# Patient Record
Sex: FEMALE | Race: ASIAN | NOT HISPANIC OR LATINO | Employment: UNEMPLOYED | URBAN - METROPOLITAN AREA
[De-identification: names, ages, dates, MRNs, and addresses within clinical notes are randomized per-mention and may not be internally consistent; named-entity substitution may affect disease eponyms.]

---

## 2017-09-06 ENCOUNTER — OFFICE VISIT (OUTPATIENT)
Dept: URGENT CARE | Facility: PHYSICIAN GROUP | Age: 27
End: 2017-09-06
Payer: COMMERCIAL

## 2017-09-06 VITALS
DIASTOLIC BLOOD PRESSURE: 76 MMHG | RESPIRATION RATE: 16 BRPM | BODY MASS INDEX: 19.83 KG/M2 | OXYGEN SATURATION: 96 % | HEART RATE: 85 BPM | WEIGHT: 101 LBS | HEIGHT: 60 IN | TEMPERATURE: 99.6 F | SYSTOLIC BLOOD PRESSURE: 110 MMHG
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
## 2017-09-06 PROCEDURE — 99203 OFFICE O/P NEW LOW 30 MIN: CPT | Performed by: PHYSICIAN ASSISTANT

## 2017-09-06 RX ORDER — TRIAMCINOLONE ACETONIDE 1 MG/G
1 CREAM TOPICAL 2 TIMES DAILY
Qty: 1 TUBE | Refills: 0 | Status: SHIPPED | OUTPATIENT
Start: 2017-09-06 | End: 2017-09-19

## 2017-09-06 RX ORDER — SULFAMETHOXAZOLE AND TRIMETHOPRIM 800; 160 MG/1; MG/1
1 TABLET ORAL 2 TIMES DAILY
Qty: 14 TAB | Refills: 0 | Status: SHIPPED | OUTPATIENT
Start: 2017-09-06 | End: 2017-09-19

## 2017-09-06 ASSESSMENT — ENCOUNTER SYMPTOMS
CHILLS: 0
VOMITING: 0
RESPIRATORY NEGATIVE: 1
LEG SWELLING: 1
SENSORY CHANGE: 1
TINGLING: 0
ABDOMINAL PAIN: 0
NAUSEA: 0
DIARRHEA: 0
FEVER: 0

## 2017-09-06 NOTE — PROGRESS NOTES
Subjective:      Kenyetta LAW Do is a 27 y.o. female who presents with Leg Swelling (C/o swelling, pain LT leg onset today.  Paramedics were called for her and marked the area, and it has grown past that since then.)            Leg Swelling   This is a new problem. The current episode started today. The problem occurs constantly. The problem has been gradually worsening. Associated symptoms include a rash. Pertinent negatives include no abdominal pain, chills, fever, nausea or vomiting. She has tried nothing for the symptoms. The treatment provided no relief.   Area of redness on the left thigh since this morning. Has been spreading, is warm, tender. Extremely itchy. He was evaluated by paramedics who instructed her to come to the urgent care. Denies any injuries. Does not remember insect bite.     PMH:  has no past medical history on file.  MEDS:   Current Outpatient Prescriptions:   •  Albuterol (VENTOLIN INH), Inhale  by mouth., Disp: , Rfl:   •  sulfamethoxazole-trimethoprim (BACTRIM DS) 800-160 MG tablet, Take 1 Tab by mouth 2 times a day., Disp: 14 Tab, Rfl: 0  •  triamcinolone acetonide (KENALOG) 0.1 % Cream, Apply 1 Application to affected area(s) 2 times a day., Disp: 1 Tube, Rfl: 0  ALLERGIES: No Known Allergies  SURGHX: No past surgical history on file.  SOCHX:  reports that she has been smoking.  She has never used smokeless tobacco.  FH: family history is not on file.    Review of Systems   Constitutional: Negative for chills and fever.   Respiratory: Negative.    Cardiovascular: Positive for leg swelling.   Gastrointestinal: Negative for abdominal pain, diarrhea, nausea and vomiting.   Skin: Positive for itching and rash.   Neurological: Positive for sensory change. Negative for tingling.       Medications, Allergies, and current problem list reviewed today in Epic     Objective:     /76   Pulse 85   Temp 37.6 °C (99.6 °F)   Resp 16   Ht 1.524 m (5')   Wt 45.8 kg (101 lb)   SpO2 96%   BMI 19.73  kg/m²      Physical Exam   Constitutional: She is oriented to person, place, and time. She appears well-developed and well-nourished. No distress.   HENT:   Head: Normocephalic and atraumatic.   Eyes: Conjunctivae and EOM are normal.   Neck: Normal range of motion. Neck supple.   Cardiovascular: Normal rate, regular rhythm and normal heart sounds.    Pulmonary/Chest: Effort normal and breath sounds normal. No respiratory distress. She has no wheezes.   Neurological: She is alert and oriented to person, place, and time.   Skin: Skin is warm and dry. Rash noted. She is not diaphoretic. There is erythema.        Large circular erythematous area on the left thigh. There is a central lesion that is a darker red color. Possible bug bite. Area is warm, pruritic. No obvious open lesions or discharge. Range of motion surrounding joints within normal limits.   Psychiatric: She has a normal mood and affect. Her behavior is normal. Judgment and thought content normal.   Nursing note and vitals reviewed.              Assessment/Plan:     1. Bug bite with infection, initial encounter  sulfamethoxazole-trimethoprim (BACTRIM DS) 800-160 MG tablet    triamcinolone acetonide (KENALOG) 0.1 % Cream     Obvious bug bite with secondary infection. Vital signs normal, no signs of systemic infection.  Bactrim  Kenalog for pruritus  OTC meds and conservative measures as discussed  Return to clinic or go to ED if symptoms worsen or persist. Indications for ED discussed at length. Patient voices understanding. Follow-up with your primary care provider in 3-5 days. Red flags discussed.    Please note that this dictation was created using voice recognition software. I have made every reasonable attempt to correct obvious errors, but I expect that there are errors of grammar and possibly content that I did not discover before finalizing the note.

## 2017-09-12 ENCOUNTER — OFFICE VISIT (OUTPATIENT)
Dept: URGENT CARE | Facility: CLINIC | Age: 27
End: 2017-09-12
Payer: COMMERCIAL

## 2017-09-12 VITALS
HEIGHT: 60 IN | OXYGEN SATURATION: 96 % | SYSTOLIC BLOOD PRESSURE: 102 MMHG | BODY MASS INDEX: 20.03 KG/M2 | RESPIRATION RATE: 16 BRPM | WEIGHT: 102 LBS | DIASTOLIC BLOOD PRESSURE: 62 MMHG | HEART RATE: 66 BPM | TEMPERATURE: 98.8 F

## 2017-09-12 DIAGNOSIS — J30.2 SEASONAL ALLERGIC RHINITIS, UNSPECIFIED ALLERGIC RHINITIS TRIGGER: ICD-10-CM

## 2017-09-12 DIAGNOSIS — J45.31 RAD (REACTIVE AIRWAY DISEASE), MILD PERSISTENT, WITH ACUTE EXACERBATION: ICD-10-CM

## 2017-09-12 PROCEDURE — 99203 OFFICE O/P NEW LOW 30 MIN: CPT | Performed by: EMERGENCY MEDICINE

## 2017-09-12 RX ORDER — FLUTICASONE PROPIONATE 220 UG/1
1 AEROSOL, METERED RESPIRATORY (INHALATION) 2 TIMES DAILY
Qty: 1 INHALER | Refills: 0 | Status: SHIPPED | OUTPATIENT
Start: 2017-09-12 | End: 2018-01-26

## 2017-09-12 RX ORDER — PREDNISONE 20 MG/1
40 TABLET ORAL DAILY
Qty: 10 TAB | Refills: 0 | Status: SHIPPED | OUTPATIENT
Start: 2017-09-12 | End: 2017-09-19 | Stop reason: SDUPTHER

## 2017-09-12 ASSESSMENT — ENCOUNTER SYMPTOMS
CHEST TIGHTNESS: 1
VOMITING: 0
COUGH: 1
APPETITE CHANGE: 0
SPUTUM PRODUCTION: 1
DIFFICULTY BREATHING: 1
ORTHOPNEA: 0
ABDOMINAL PAIN: 0
DYSPNEA ON EXERTION: 0
HEARTBURN: 0
HEMOPTYSIS: 0
TROUBLE SWALLOWING: 0
NAUSEA: 0
MYALGIAS: 0
HEADACHES: 0
SWEATS: 0
FEVER: 0
WHEEZING: 1
EYE REDNESS: 0
RHINORRHEA: 0
SORE THROAT: 0
DIARRHEA: 0
EYE DISCHARGE: 0
PALPITATIONS: 0
SHORTNESS OF BREATH: 1

## 2017-09-12 NOTE — PROGRESS NOTES
Subjective:      Kenyetta LAW Do is a 27 y.o. female who presents with Asthma (pt states she has asthma and her inhalor is not helping)            Asthma   She complains of chest tightness, cough, difficulty breathing, shortness of breath, sputum production and wheezing. There is no hemoptysis. This is a new problem. The current episode started more than 1 month ago. The problem occurs daily. The problem has been waxing and waning. The cough is productive of sputum. Associated symptoms include nasal congestion. Pertinent negatives include no appetite change, chest pain, dyspnea on exertion, ear congestion, ear pain, fever, headaches, heartburn, malaise/fatigue, myalgias, orthopnea, postnasal drip, rhinorrhea, sneezing, sore throat, sweats or trouble swallowing. Her symptoms are aggravated by lying down. Her symptoms are alleviated by beta-agonist. She reports moderate improvement on treatment. Risk factors for lung disease include smoking/tobacco exposure. Her past medical history is significant for asthma.   Notes high childhood history of wheezing episodes; has [place]. She notes over the last several weeks gradually increasing need for albuterol and decreasing effectiveness.    Review of Systems   Constitutional: Negative for appetite change, fever and malaise/fatigue.   HENT: Positive for congestion and nosebleeds. Negative for ear pain, hearing loss, postnasal drip, rhinorrhea, sneezing, sore throat and trouble swallowing.    Eyes: Negative for discharge and redness.   Respiratory: Positive for cough, sputum production, shortness of breath and wheezing. Negative for hemoptysis.    Cardiovascular: Negative for chest pain, dyspnea on exertion and palpitations.   Gastrointestinal: Negative for abdominal pain, diarrhea, heartburn, nausea and vomiting.   Musculoskeletal: Negative for myalgias.   Skin: Negative for rash.   Neurological: Negative for headaches.    Endo/Heme/Allergies: Positive for environmental allergies.     PMH:  has no past medical history on file.  MEDS:   Current Outpatient Prescriptions:   •  Albuterol Sulfate 108 (90 Base) MCG/ACT AEROSOL POWDER, BREATH ACTIVATED, Inhale 1-2 Puffs by mouth every 6 hours as needed., Disp: 1 Each, Rfl: 0  •  fluticasone (FLOVENT HFA) 220 MCG/ACT Aerosol, Inhale 1 Puff by mouth 2 times a day., Disp: 1 Inhaler, Rfl: 0  •  predniSONE (DELTASONE) 20 MG Tab, Take 2 Tabs by mouth every day., Disp: 10 Tab, Rfl: 0  •  Albuterol (VENTOLIN INH), Inhale  by mouth., Disp: , Rfl:   •  sulfamethoxazole-trimethoprim (BACTRIM DS) 800-160 MG tablet, Take 1 Tab by mouth 2 times a day., Disp: 14 Tab, Rfl: 0  •  triamcinolone acetonide (KENALOG) 0.1 % Cream, Apply 1 Application to affected area(s) 2 times a day., Disp: 1 Tube, Rfl: 0  ALLERGIES: No Known Allergies  SURGHX: No past surgical history on file.  SOCHX:  reports that she has been smoking.  She has never used smokeless tobacco.  FH: family history is not on file.     Objective:     /62   Pulse 66   Temp 37.1 °C (98.8 °F)   Resp 16   Ht 1.524 m (5')   Wt 46.3 kg (102 lb)   SpO2 96%   BMI 19.92 kg/m²      Physical Exam   Constitutional: She is oriented to person, place, and time. She appears well-developed and well-nourished. She is cooperative. She does not have a sickly appearance. She does not appear ill. No distress.   HENT:   Head: Normocephalic.   Right Ear: Tympanic membrane and ear canal normal.   Left Ear: Tympanic membrane and ear canal normal.   Nose: Mucosal edema present. No rhinorrhea. No epistaxis.   Mouth/Throat: Mucous membranes are normal. No uvula swelling. No oropharyngeal exudate, posterior oropharyngeal edema or posterior oropharyngeal erythema.   Eyes: Conjunctivae are normal.   Neck: Trachea normal and phonation normal. Neck supple. No JVD present.   Cardiovascular: Normal rate, regular rhythm and normal heart sounds.    No murmur  heard.  Pulmonary/Chest: No accessory muscle usage. No tachypnea. No respiratory distress. She has no decreased breath sounds. She has wheezes in the right upper field and the left upper field. She has no rales.   Abdominal: Soft. There is no tenderness.   Musculoskeletal:   No significant pedal edema.   Neurological: She is alert and oriented to person, place, and time.   Skin: Skin is warm and dry. No rash noted.   Psychiatric: She has a normal mood and affect.               Assessment/Plan:     1. RAD (reactive airway disease), mild persistent, with acute exacerbation  - Albuterol Sulfate 108 (90 Base) MCG/ACT AEROSOL POWDER, BREATH ACTIVATED; Inhale 1-2 Puffs by mouth every 6 hours as needed.  Dispense: 1 Each; Refill: 0  - fluticasone (FLOVENT HFA) 220 MCG/ACT Aerosol; Inhale 1 Puff by mouth 2 times a day.  Dispense: 1 Inhaler; Refill: 0  - predniSONE (DELTASONE) 20 MG Tab; Take 2 Tabs by mouth every day.  Dispense: 10 Tab; Refill: 0  Advised need for PCP follow-up, smoking cessation/avoidance.  2. Seasonal allergic rhinitis, unspecified allergic rhinitis trigger  Recommended nasal saline irrigation daily, OTC inhaled nasal steroid daily.

## 2017-09-12 NOTE — PATIENT INSTRUCTIONS
Avoid smoking!  Cessation is highly recommended, but at least attempt to reduce frequency of smoking until the illness resolves.  Use saline nasal irrigation, such as with a Neti Pot, as needed daily for relief of nasal or sinus congestion relief.  Use over-the-counter inhaled nasal steroid (Flonase, Nasonex, Rhinocort, Nasacort) daily; continue for at least 2-3 weeks.  You should contact a primary care provider for follow-up as soon as available.  Go to the nearest hospital Emergency Department, or call 911, if any worsening condition.      Bronchospasm, Adult  A bronchospasm is when the tubes that carry air in and out of your lungs (airways) spasm or tighten. During a bronchospasm it is hard to breathe. This is because the airways get smaller. A bronchospasm can be triggered by:  · Allergies. These may be to animals, pollen, food, or mold.  · Infection. This is a common cause of bronchospasm.  · Exercise.  · Irritants. These include pollution, cigarette smoke, strong odors, aerosol sprays, and paint fumes.  · Weather changes.  · Stress.  · Being emotional.  HOME CARE   · Always have a plan for getting help. Know when to call your doctor and local emergency services (911 in the [place] Know where you can get emergency care.  · Only take medicines as told by your doctor.  · If you were prescribed an inhaler or nebulizer machine, ask your doctor how to use it correctly. Always use a spacer with your inhaler if you were given one.  · Stay calm during an attack. Try to relax and breathe more slowly.  · Control your home environment:  ¨ Change your heating and air conditioning filter at least once a month.  ¨ Limit your use of fireplaces and wood stoves.  ¨ Do not  smoke. Do not  allow smoking in your home.  ¨ Avoid perfumes and fragrances.  ¨ Get rid of pests (such as roaches and mice) and their droppings.  ¨ Throw away plants if you see mold on them.  ¨ Keep your house clean and dust free.  ¨ Replace carpet with wood,  tile, or vinyl aurora. Carpet can trap dander and dust.  ¨ Use allergy-proof pillows, mattress covers, and box spring covers.  ¨ Wash bed sheets and blankets every week in hot water. Dry them in a dryer.  ¨ Use blankets that are made of polyester or cotton.  ¨ Wash hands frequently.  GET HELP IF:  · You have muscle aches.  · You have chest pain.  · The thick spit you spit or cough up (sputum) changes from clear or white to yellow, green, gray, or bloody.  · The thick spit you spit or cough up gets thicker.  · There are problems that may be related to the medicine you are given such as:  ¨ A rash.  ¨ Itching.  ¨ Swelling.  ¨ Trouble breathing.  GET HELP RIGHT AWAY IF:  · You feel you cannot breathe or catch your breath.  · You cannot stop coughing.  · Your treatment is not helping you breathe better.  · You have very bad chest pain.  MAKE SURE YOU:   · Understand these instructions.  · Will watch your condition.  · Will get help right away if you are not doing well or get worse.     This information is not intended to replace advice given to you by your health care provider. Make sure you discuss any questions you have with your health care provider.     Document Released: 10/15/2010 Document Revised: 01/08/2016 Document Reviewed: 06/10/2014  [employer] Interactive Patient Education ©2016 [employer]  Allergic Rhinitis  Allergic rhinitis is when the mucous membranes in the nose respond to allergens. Allergens are particles in the air that cause your body to have an allergic reaction. This causes you to release allergic antibodies. Through a chain of events, these eventually cause you to release histamine into the blood stream. Although meant to protect the body, it is this release of histamine that causes your discomfort, such as frequent sneezing, congestion, and an itchy, runny nose.   CAUSES  Seasonal allergic rhinitis (hay fever) is caused by pollen allergens that may come from grasses, trees, and weeds.  Year-round allergic rhinitis (perennial allergic rhinitis) is caused by allergens such as house dust mites, pet dander, and mold spores.  SYMPTOMS  · Nasal stuffiness (congestion).  · Itchy, runny nose with sneezing and tearing of the eyes.  DIAGNOSIS  Your health care provider can help you determine the allergen or allergens that trigger your symptoms. If you and your health care provider are unable to determine the allergen, skin or blood testing may be used. Your health care provider will diagnose your condition after taking your health history and performing a physical exam. Your health care provider may assess you for other related conditions, such as asthma, pink eye, or an ear infection.  TREATMENT  Allergic rhinitis does not have a cure, but it can be controlled by:  · Medicines that block allergy symptoms. These may include allergy shots, nasal sprays, and oral antihistamines.  · Avoiding the allergen.  Hay fever may often be treated with antihistamines in pill or nasal spray forms. Antihistamines block the effects of histamine. There are over-the-counter medicines that may help with nasal congestion and swelling around the eyes. Check with your health care provider before taking or giving this medicine.  If avoiding the allergen or the medicine prescribed do not work, there are many new medicines your health care provider can prescribe. Stronger medicine may be used if initial measures are ineffective. Desensitizing injections can be used if medicine and avoidance does not work. Desensitization is when a patient is given ongoing shots until the body becomes less sensitive to the allergen. Make sure you follow up with your health care provider if problems continue.  HOME CARE INSTRUCTIONS  It is not possible to completely avoid allergens, but you can reduce your symptoms by taking steps to limit your exposure to them. It helps to know exactly what you are allergic to so that you can avoid your specific  triggers.  SEEK MEDICAL CARE IF:  · You have a fever.  · You develop a cough that does not stop easily (persistent).  · You have shortness of breath.  · You start wheezing.  · Symptoms interfere with normal daily activities.     This information is not intended to replace advice given to you by your health care provider. Make sure you discuss any questions you have with your health care provider.     Document Released: 09/12/2002 Document Revised: 01/08/2016 Document Reviewed: 08/25/2014  [employer] Interactive Patient Education ©2016 [employer]

## 2017-09-19 ENCOUNTER — HOSPITAL ENCOUNTER (OUTPATIENT)
Dept: RADIOLOGY | Facility: MEDICAL CENTER | Age: 27
End: 2017-09-19
Attending: PHYSICIAN ASSISTANT
Payer: COMMERCIAL

## 2017-09-19 ENCOUNTER — OFFICE VISIT (OUTPATIENT)
Dept: MEDICAL GROUP | Facility: MEDICAL CENTER | Age: 27
End: 2017-09-19
Payer: COMMERCIAL

## 2017-09-19 VITALS
TEMPERATURE: 98.5 F | OXYGEN SATURATION: 98 % | DIASTOLIC BLOOD PRESSURE: 50 MMHG | HEART RATE: 66 BPM | WEIGHT: 105.8 LBS | HEIGHT: 60 IN | SYSTOLIC BLOOD PRESSURE: 90 MMHG | RESPIRATION RATE: 16 BRPM | BODY MASS INDEX: 20.77 KG/M2

## 2017-09-19 DIAGNOSIS — M54.6 THORACIC SPINE PAIN: ICD-10-CM

## 2017-09-19 DIAGNOSIS — Z13.6 SCREENING FOR CARDIOVASCULAR CONDITION: ICD-10-CM

## 2017-09-19 DIAGNOSIS — J45.31 RAD (REACTIVE AIRWAY DISEASE), MILD PERSISTENT, WITH ACUTE EXACERBATION: ICD-10-CM

## 2017-09-19 DIAGNOSIS — Z87.898 HISTORY OF DIZZINESS: ICD-10-CM

## 2017-09-19 DIAGNOSIS — Z00.00 WELLNESS EXAMINATION: ICD-10-CM

## 2017-09-19 DIAGNOSIS — M54.50 LUMBAR SPINE PAIN: ICD-10-CM

## 2017-09-19 PROCEDURE — 99214 OFFICE O/P EST MOD 30 MIN: CPT | Performed by: PHYSICIAN ASSISTANT

## 2017-09-19 PROCEDURE — 72070 X-RAY EXAM THORAC SPINE 2VWS: CPT

## 2017-09-19 PROCEDURE — 72100 X-RAY EXAM L-S SPINE 2/3 VWS: CPT

## 2017-09-19 RX ORDER — PREDNISONE 20 MG/1
40 TABLET ORAL DAILY
Qty: 10 TAB | Refills: 0 | Status: SHIPPED | OUTPATIENT
Start: 2017-09-19 | End: 2017-12-18

## 2017-09-19 ASSESSMENT — PATIENT HEALTH QUESTIONNAIRE - PHQ9: CLINICAL INTERPRETATION OF PHQ2 SCORE: 0

## 2017-09-19 NOTE — PATIENT INSTRUCTIONS
Dizziness  Dizziness is a common problem. It is a feeling of unsteadiness or light-headedness. You may feel like you are about to faint. Dizziness can lead to injury if you stumble or fall. Anyone can become dizzy, but dizziness is more common in older adults. This condition can be caused by a number of things, including medicines, dehydration, or illness.  HOME CARE INSTRUCTIONS  Taking these steps may help with your condition:  Eating and Drinking  · Drink enough fluid to keep your urine clear or pale yellow. This helps to keep you from becoming dehydrated. Try to drink more clear fluids, such as water.  · Do not drink alcohol.  · Limit your caffeine intake if directed by your health care provider.  · Limit your salt intake if directed by your health care provider.  Activity  · Avoid making quick movements.  ¨ Rise slowly from chairs and steady yourself until you feel okay.  ¨ In the morning, first sit up on the side of the bed. When you feel okay, stand slowly while you hold onto something until you know that your balance is fine.  · Move your legs often if you need to  one place for a long time. Tighten and relax your muscles in your legs while you are standing.  · Do not drive or operate heavy machinery if you feel dizzy.  · Avoid bending down if you feel dizzy. Place items in your home so that they are easy for you to reach without leaning over.  Lifestyle  · Do not use any tobacco products, including cigarettes, chewing tobacco, or electronic cigarettes. If you need help quitting, ask your health care provider.  · Try to reduce your stress level, such as with yoga or meditation. Talk with your health care provider if you need help.  General Instructions  · Watch your dizziness for any changes.  · Take medicines only as directed by your health care provider. Talk with your health care provider if you think that your dizziness is caused by a medicine that you are taking.  · Tell a friend or a family  member that you are feeling dizzy. If he or she notices any changes in your behavior, have this person call your health care provider.  · Keep all follow-up visits as directed by your health care provider. This is important.  SEEK MEDICAL CARE IF:  · Your dizziness does not go away.  · Your dizziness or light-headedness gets worse.  · You feel nauseous.  · You have reduced hearing.  · You have new symptoms.  · You are unsteady on your feet or you feel like the room is spinning.  SEEK IMMEDIATE MEDICAL CARE IF:  · You vomit or have diarrhea and are unable to eat or drink anything.  · You have problems talking, walking, swallowing, or using your arms, hands, or legs.  · You feel generally weak.  · You are not thinking clearly or you have trouble forming sentences. It may take a friend or family member to notice this.  · You have chest pain, abdominal pain, shortness of breath, or sweating.  · Your vision changes.  · You notice any bleeding.  · You have a headache.  · You have neck pain or a stiff neck.  · You have a fever.     This information is not intended to replace advice given to you by your health care provider. Make sure you discuss any questions you have with your health care provider.     Document Released: 06/13/2002 Document Revised: 05/03/2016 Document Reviewed: 12/14/2015  [employer] Interactive Patient Education ©2016 [employer]

## 2017-09-21 PROBLEM — M54.50 LUMBAR SPINE PAIN: Status: ACTIVE | Noted: 2017-09-21

## 2017-09-21 PROBLEM — M54.6 THORACIC SPINE PAIN: Status: ACTIVE | Noted: 2017-09-21

## 2017-09-21 PROBLEM — Z87.898 HISTORY OF DIZZINESS: Status: ACTIVE | Noted: 2017-09-21

## 2017-09-21 PROBLEM — J45.909 RAD (REACTIVE AIRWAY DISEASE): Status: ACTIVE | Noted: 2017-09-21

## 2017-09-22 ENCOUNTER — HOSPITAL ENCOUNTER (OUTPATIENT)
Dept: LAB | Facility: MEDICAL CENTER | Age: 27
End: 2017-09-22
Attending: PHYSICIAN ASSISTANT
Payer: COMMERCIAL

## 2017-09-22 ENCOUNTER — NON-PROVIDER VISIT (OUTPATIENT)
Dept: URGENT CARE | Facility: PHYSICIAN GROUP | Age: 27
End: 2017-09-22
Payer: COMMERCIAL

## 2017-09-22 DIAGNOSIS — Z13.6 SCREENING FOR CARDIOVASCULAR CONDITION: ICD-10-CM

## 2017-09-22 DIAGNOSIS — Z00.00 WELLNESS EXAMINATION: ICD-10-CM

## 2017-09-22 DIAGNOSIS — Z23 NEEDS FLU SHOT: ICD-10-CM

## 2017-09-22 LAB
25(OH)D3 SERPL-MCNC: 23 NG/ML (ref 30–100)
ALBUMIN SERPL BCP-MCNC: 4.3 G/DL (ref 3.2–4.9)
ALBUMIN/GLOB SERPL: 1.5 G/DL
ALP SERPL-CCNC: 65 U/L (ref 30–99)
ALT SERPL-CCNC: 14 U/L (ref 2–50)
ANION GAP SERPL CALC-SCNC: 10 MMOL/L (ref 0–11.9)
AST SERPL-CCNC: 20 U/L (ref 12–45)
BASOPHILS # BLD AUTO: 0.6 % (ref 0–1.8)
BASOPHILS # BLD: 0.06 K/UL (ref 0–0.12)
BILIRUB SERPL-MCNC: 0.5 MG/DL (ref 0.1–1.5)
BUN SERPL-MCNC: 13 MG/DL (ref 8–22)
CALCIUM SERPL-MCNC: 9.7 MG/DL (ref 8.5–10.5)
CHLORIDE SERPL-SCNC: 105 MMOL/L (ref 96–112)
CHOLEST SERPL-MCNC: 157 MG/DL (ref 100–199)
CO2 SERPL-SCNC: 24 MMOL/L (ref 20–33)
CREAT SERPL-MCNC: 0.72 MG/DL (ref 0.5–1.4)
EOSINOPHIL # BLD AUTO: 0.3 K/UL (ref 0–0.51)
EOSINOPHIL NFR BLD: 3.2 % (ref 0–6.9)
ERYTHROCYTE [DISTWIDTH] IN BLOOD BY AUTOMATED COUNT: 45.5 FL (ref 35.9–50)
GFR SERPL CREATININE-BSD FRML MDRD: >60 ML/MIN/1.73 M 2
GLOBULIN SER CALC-MCNC: 2.8 G/DL (ref 1.9–3.5)
GLUCOSE SERPL-MCNC: 55 MG/DL (ref 65–99)
HCT VFR BLD AUTO: 42.9 % (ref 37–47)
HDLC SERPL-MCNC: 60 MG/DL
HGB BLD-MCNC: 13.6 G/DL (ref 12–16)
IMM GRANULOCYTES # BLD AUTO: 0.06 K/UL (ref 0–0.11)
IMM GRANULOCYTES NFR BLD AUTO: 0.6 % (ref 0–0.9)
LDLC SERPL CALC-MCNC: 81 MG/DL
LYMPHOCYTES # BLD AUTO: 3.57 K/UL (ref 1–4.8)
LYMPHOCYTES NFR BLD: 38.1 % (ref 22–41)
MCH RBC QN AUTO: 29.1 PG (ref 27–33)
MCHC RBC AUTO-ENTMCNC: 31.7 G/DL (ref 33.6–35)
MCV RBC AUTO: 91.9 FL (ref 81.4–97.8)
MONOCYTES # BLD AUTO: 0.53 K/UL (ref 0–0.85)
MONOCYTES NFR BLD AUTO: 5.7 % (ref 0–13.4)
NEUTROPHILS # BLD AUTO: 4.85 K/UL (ref 2–7.15)
NEUTROPHILS NFR BLD: 51.8 % (ref 44–72)
NRBC # BLD AUTO: 0 K/UL
NRBC BLD AUTO-RTO: 0 /100 WBC
PLATELET # BLD AUTO: 268 K/UL (ref 164–446)
PMV BLD AUTO: 10.6 FL (ref 9–12.9)
POTASSIUM SERPL-SCNC: 4.2 MMOL/L (ref 3.6–5.5)
PROT SERPL-MCNC: 7.1 G/DL (ref 6–8.2)
RBC # BLD AUTO: 4.67 M/UL (ref 4.2–5.4)
SODIUM SERPL-SCNC: 139 MMOL/L (ref 135–145)
TRIGL SERPL-MCNC: 81 MG/DL (ref 0–149)
TSH SERPL DL<=0.005 MIU/L-ACNC: 1.32 UIU/ML (ref 0.3–3.7)
VIT B12 SERPL-MCNC: 382 PG/ML (ref 211–911)
WBC # BLD AUTO: 9.4 K/UL (ref 4.8–10.8)

## 2017-09-22 PROCEDURE — 82607 VITAMIN B-12: CPT

## 2017-09-22 PROCEDURE — 85025 COMPLETE CBC W/AUTO DIFF WBC: CPT

## 2017-09-22 PROCEDURE — 36415 COLL VENOUS BLD VENIPUNCTURE: CPT

## 2017-09-22 PROCEDURE — 82306 VITAMIN D 25 HYDROXY: CPT

## 2017-09-22 PROCEDURE — 90686 IIV4 VACC NO PRSV 0.5 ML IM: CPT | Performed by: PHYSICIAN ASSISTANT

## 2017-09-22 PROCEDURE — 90471 IMMUNIZATION ADMIN: CPT | Performed by: PHYSICIAN ASSISTANT

## 2017-09-22 PROCEDURE — 84443 ASSAY THYROID STIM HORMONE: CPT

## 2017-09-22 PROCEDURE — 80053 COMPREHEN METABOLIC PANEL: CPT

## 2017-09-22 PROCEDURE — 80061 LIPID PANEL: CPT

## 2017-09-22 NOTE — PROGRESS NOTES
Chief Complaint   Patient presents with   • Establish Care   • Asthma     uses ventolin and prednisone 20 mg   • Other     dizziness, excessivbe hunger, fainting, fatigue, shakiness, dry mouth, lingling lips, blurred vision, palpitation       HISTORY OF THE PRESENT ILLNESS: This is a 27 y.o. female new patient to our practice. This pleasant patient is here todayTo establish care. She has had some illness, concerns of dizziness, back pain.    RAD (reactive airway disease)  Seen on September 12 with cough, shortness breath, wheezing. She states she gets this about twice a year with season changes. Diagnosed as reactive airway disease. Put on antibiotic, albuterol inhaler, Flovent, prednisone. Feeling better but thinks she might need a refill of the prednisone just in case. Normally does not need albuterol for any asthma symptoms. No recent travel.    History of dizziness  Over the past year or more has episodes of dizziness, hunger, feeling faint, fatigue, shakiness, dry mouth, tingling lips, blurred vision, palpitations. These episodes can last for a few minutes. Worse when going from sitting to standing position. Not sure of cause. Has not had recent labs. No known heart or lung disease. No loss of consciousness. No history of head injury.    Thoracic spine pain  Sometimes has left sided mid back soreness. Not sure of cause. No specific injury.    Lumbar spine pain  Sometimes has low back pain. Can be either side. No pain radiating down to legs. No numbness or toe tingling in legs. No specific injury.      Past Medical History:   Diagnosis Date   • Asthma        No past surgical history on file.    Family Status   Relation Status   • Mother Alive   • Father Alive     Family History   Problem Relation Age of Onset   • Other Mother      DDD   • Asthma Father        Social History   Substance Use Topics   • Smoking status: Current Every Day Smoker     Types: Cigarettes   • Smokeless tobacco: Never Used   • Alcohol use  Yes      Comment: Rarely       Allergies: Review of patient's allergies indicates no known allergies.    Current Outpatient Prescriptions Ordered in [facility]   Medication Sig Dispense Refill   • predniSONE (DELTASONE) 20 MG Tab Take 2 Tabs by mouth every day. 10 Tab 0   • Albuterol Sulfate 108 (90 Base) MCG/ACT AEROSOL POWDER, BREATH ACTIVATED Inhale 1-2 Puffs by mouth every 6 hours as needed. 1 Each 0   • fluticasone (FLOVENT HFA) 220 MCG/ACT Aerosol Inhale 1 Puff by mouth 2 times a day. 1 Inhaler 0     No current Epic-ordered facility-administered medications on file.        Review of Systems   Constitutional: Negative for fever, chills,   HENT: Negative for ear pain, nosebleeds, congestion, sore throat and neck pain.    Eyes: Negative for blurred vision.   Cardiovascular: Negative for chest pain, orthopnea and leg swelling.   Gastrointestinal: Negative for heartburn, nausea, vomiting and abdominal pain.   Genitourinary: Negative for dysuria, urgency and frequency.   Musculoskeletal: Negative for myalgias, back pain and joint pain.   Skin: Negative for rash and itching.   Neurological: Negative for focal weakness and headaches.   Endo/Heme/Allergies: Does not bruise/bleed easily.   Psychiatric/Behavioral: Negative for depression, anxiety, or memory loss.     All other systems reviewed and are negative except as in HPI.    Exam: Blood pressure (!) 90/50, pulse 66, temperature 36.9 °C (98.5 °F), resp. rate 16, height 1.524 m (5'), weight 48 kg (105 lb 12.8 oz), SpO2 98 %.  General: Normal appearing. No distress.  HEENT: Normocephalic. Eyes conjunctiva clear lids without ptosis, pupils equal and reactive to light accommodation, ears normal shape and contour, canals are clear bilaterally, tympanic membranes are benign, nasal mucosa benign, oropharynx is without erythema, edema or exudates.   Neck: Supple without JVD or bruit. Thyroid is not enlarged.  Pulmonary: Clear to ausculation.  Normal effort. No rales, ronchi, or  wheezing.  Cardiovascular: Regular rate and rhythm without murmur. Carotid and radial pulses are intact and equal bilaterally.  Abdomen: Soft, nontender, nondistended. Normal bowel sounds. Liver and spleen are not palpable  Neurologic: Grossly nonfocal  Lymph: No cervical, supraclavicular or axillary lymph nodes are palpable  Skin: Warm and dry.  No obvious lesions.  Musculoskeletal: Normal gait. No extremity cyanosis, clubbing, or edema.  Psych: Normal mood and affect. Alert and oriented x3. Judgment and insight is normal.    Assessment/Plan  1. RAD (reactive airway disease), mild persistent, with acute exacerbation  predniSONE (DELTASONE) 20 MG Tab   2. Wellness examination  CBC WITH DIFFERENTIAL    COMP METABOLIC PANEL    TSH WITH REFLEX TO FT4    VITAMIN B12    VITAMIN D,25 HYDROXY   3. Screening for cardiovascular condition  LIPID PROFILE   4. History of dizziness     5. Thoracic spine pain  DX-THORACIC SPINE-2 VIEWS   6. Lumbar spine pain  DX-LUMBAR SPINE-2 OR 3 VIEWS   Discussed with patient that I do not think she will need an additional course of prednisone but she can keep it on hand just in case symptoms recur. She should continue the Flovent for at least another 2 weeks. Advised general screening lab work as well as x-rays for her back. Would like her to follow up after these tests are done. May need to consider EKG, cardiology, neurology to follow up on episodes of dizziness. May be orthostatic hypotension. May be low blood sugar. We'll follow-up.

## 2017-09-22 NOTE — ASSESSMENT & PLAN NOTE
Over the past year or more has episodes of dizziness, hunger, feeling faint, fatigue, shakiness, dry mouth, tingling lips, blurred vision, palpitations. These episodes can last for a few minutes. Worse when going from sitting to standing position. Not sure of cause. Has not had recent labs. No known heart or lung disease. No loss of consciousness. No history of head injury.

## 2017-09-22 NOTE — ASSESSMENT & PLAN NOTE
Seen on September 12 with cough, shortness breath, wheezing. She states she gets this about twice a year with season changes. Diagnosed as reactive airway disease. Put on antibiotic, albuterol inhaler, Flovent, prednisone. Feeling better but thinks she might need a refill of the prednisone just in case. Normally does not need albuterol for any asthma symptoms. No recent travel.

## 2017-09-22 NOTE — ASSESSMENT & PLAN NOTE
Sometimes has low back pain. Can be either side. No pain radiating down to legs. No numbness or toe tingling in legs. No specific injury.

## 2017-09-26 ENCOUNTER — PATIENT MESSAGE (OUTPATIENT)
Dept: MEDICAL GROUP | Facility: MEDICAL CENTER | Age: 27
End: 2017-09-26

## 2017-12-18 ENCOUNTER — OFFICE VISIT (OUTPATIENT)
Dept: MEDICAL GROUP | Facility: MEDICAL CENTER | Age: 27
End: 2017-12-18
Payer: COMMERCIAL

## 2017-12-18 VITALS
HEIGHT: 60 IN | RESPIRATION RATE: 16 BRPM | WEIGHT: 104.2 LBS | BODY MASS INDEX: 20.46 KG/M2 | SYSTOLIC BLOOD PRESSURE: 90 MMHG | DIASTOLIC BLOOD PRESSURE: 58 MMHG | TEMPERATURE: 98.6 F | OXYGEN SATURATION: 98 % | HEART RATE: 105 BPM

## 2017-12-18 DIAGNOSIS — R06.02 SOB (SHORTNESS OF BREATH): ICD-10-CM

## 2017-12-18 DIAGNOSIS — J45.51 SEVERE PERSISTENT ASTHMA WITH ACUTE EXACERBATION: ICD-10-CM

## 2017-12-18 PROCEDURE — 99214 OFFICE O/P EST MOD 30 MIN: CPT | Performed by: PHYSICIAN ASSISTANT

## 2017-12-18 RX ORDER — PREDNISONE 20 MG/1
TABLET ORAL
Qty: 10 TAB | Refills: 0 | Status: SHIPPED | OUTPATIENT
Start: 2017-12-18 | End: 2018-03-20

## 2017-12-18 NOTE — PROGRESS NOTES
Subjective:      Kenyetta LAW Do is a 27 y.o. female who presents with Asthma (getting worse)            HPIPatient feels her asthma is getting worse. No known cause. No recent illness. No animals in house. No known issue with dust or mold. Using flovent 2 puffs once daily, albuterol few times a day. Feels more SOB, more coughing. No fever/chills. No chest pain, does feel tightness. Has used prednisone oral tablets in the past that was helpful. Has not seen pulmonologist in the past.    ROSno fever/chills. No headache/dizziness. No neck or back pain. No n/v/d/c or abdominal pain. No rash or skin lesion. No joint pain, redness. No urinary symptoms.    PMHX: negative for heart disease. Negative for diabetes or immune disorder  Meds: albuterol, flovent  nkda  Non-smoker   Objective:     BP (!) 90/58   Pulse (!) 105   Temp 37 °C (98.6 °F)   Resp 16   Ht 1.524 m (5')   Wt 47.3 kg (104 lb 3.2 oz)   SpO2 98%   BMI 20.35 kg/m²      Physical Exam   Constitutional: She is oriented to person, place, and time. She appears well-developed and well-nourished. No distress.   HENT:   Head: Normocephalic and atraumatic.   Right Ear: Hearing, tympanic membrane, external ear and ear canal normal.   Left Ear: Hearing, tympanic membrane, external ear and ear canal normal.   Nose: Nose normal.   Mouth/Throat: Uvula is midline, oropharynx is clear and moist and mucous membranes are normal.   Eyes: Conjunctivae are normal.   Neck: Normal range of motion. Neck supple.   Cardiovascular: Regular rhythm.    tachycardia   Pulmonary/Chest: She has wheezes.   Lymphadenopathy:     She has no cervical adenopathy.   Neurological: She is alert and oriented to person, place, and time.   Skin: Skin is warm and dry. No rash noted. She is not diaphoretic. No pallor.   Psychiatric: She has a normal mood and affect. Her behavior is normal. Judgment and thought content normal.   Vitals reviewed.              Assessment/Plan:     1. SOB (shortness of  breath)    - predniSONE (DELTASONE) 20 MG Tab; 2 tabs by mouth daily for 5 days  Dispense: 10 Tab; Refill: 0  - REFERRAL TO PULMONOLOGY    2. Severe persistent asthma with acute exacerbation    - predniSONE (DELTASONE) 20 MG Tab; 2 tabs by mouth daily for 5 days  Dispense: 10 Tab; Refill: 0  - REFERRAL TO PULMONOLOGY    ER precautions given. F/u after pulmonology evaluation.

## 2018-01-02 DIAGNOSIS — J45.51 SEVERE PERSISTENT ASTHMA WITH ACUTE EXACERBATION: ICD-10-CM

## 2018-01-08 ENCOUNTER — PATIENT MESSAGE (OUTPATIENT)
Dept: MEDICAL GROUP | Facility: MEDICAL CENTER | Age: 28
End: 2018-01-08

## 2018-01-08 DIAGNOSIS — J45.41 MODERATE PERSISTENT REACTIVE AIRWAY DISEASE WITH ACUTE EXACERBATION: ICD-10-CM

## 2018-01-12 RX ORDER — METHYLPREDNISOLONE 4 MG/1
TABLET ORAL
Qty: 21 TAB | Refills: 0 | Status: SHIPPED | OUTPATIENT
Start: 2018-01-12 | End: 2018-01-18

## 2018-01-26 ENCOUNTER — NON-PROVIDER VISIT (OUTPATIENT)
Dept: PULMONOLOGY | Facility: HOSPICE | Age: 28
End: 2018-01-26
Payer: COMMERCIAL

## 2018-01-26 ENCOUNTER — OFFICE VISIT (OUTPATIENT)
Dept: PULMONOLOGY | Facility: HOSPICE | Age: 28
End: 2018-01-26
Payer: COMMERCIAL

## 2018-01-26 VITALS
HEART RATE: 81 BPM | SYSTOLIC BLOOD PRESSURE: 90 MMHG | HEIGHT: 61 IN | BODY MASS INDEX: 19.63 KG/M2 | WEIGHT: 104 LBS | RESPIRATION RATE: 16 BRPM | TEMPERATURE: 97.4 F | OXYGEN SATURATION: 98 % | DIASTOLIC BLOOD PRESSURE: 58 MMHG

## 2018-01-26 VITALS — BODY MASS INDEX: 19.63 KG/M2 | WEIGHT: 104 LBS | HEIGHT: 61 IN

## 2018-01-26 DIAGNOSIS — J45.51 SEVERE PERSISTENT ASTHMA WITH ACUTE EXACERBATION: ICD-10-CM

## 2018-01-26 DIAGNOSIS — J30.2 ACUTE SEASONAL ALLERGIC RHINITIS, UNSPECIFIED TRIGGER: ICD-10-CM

## 2018-01-26 PROCEDURE — 94060 EVALUATION OF WHEEZING: CPT | Performed by: INTERNAL MEDICINE

## 2018-01-26 PROCEDURE — 94729 DIFFUSING CAPACITY: CPT | Performed by: INTERNAL MEDICINE

## 2018-01-26 PROCEDURE — 99244 OFF/OP CNSLTJ NEW/EST MOD 40: CPT | Performed by: INTERNAL MEDICINE

## 2018-01-26 PROCEDURE — 94726 PLETHYSMOGRAPHY LUNG VOLUMES: CPT | Performed by: INTERNAL MEDICINE

## 2018-01-26 RX ORDER — BUDESONIDE AND FORMOTEROL FUMARATE DIHYDRATE 80; 4.5 UG/1; UG/1
2 AEROSOL RESPIRATORY (INHALATION) 2 TIMES DAILY
Qty: 1 INHALER | Refills: 2 | Status: SHIPPED | OUTPATIENT
Start: 2018-01-26 | End: 2018-03-20

## 2018-01-26 RX ORDER — MONTELUKAST SODIUM 10 MG/1
10 TABLET ORAL
Qty: 30 TAB | Refills: 2 | Status: SHIPPED | OUTPATIENT
Start: 2018-01-26 | End: 2018-03-20

## 2018-01-26 ASSESSMENT — PULMONARY FUNCTION TESTS
FEV1/FVC_PERCENT_PREDICTED: 87
FVC: 2.03
FVC_PREDICTED: 3.43
FEV1/FVC_PERCENT_CHANGE: 205
FEV1_PERCENT_PREDICTED: 29
FEV1_PERCENT_PREDICTED: 43
FEV1/FVC_PERCENT_PREDICTED: 72
FEV1/FVC: 63
FEV1_LLN: 2.45
FEV1_PREDICTED: 2.97
FEV1/FVC_PERCENT_PREDICTED: 60
FVC_PERCENT_PREDICTED: 60
FVC_PERCENT_PREDICTED: 48
FEV1: .87
FEV1/FVC: 53
FVC: 1.65
FEV1_PERCENT_CHANGE: 22
FEV1/FVC: 62.56
FEV1_PERCENT_CHANGE: 45
FVC_LLN: 2.81
FEV1/FVC: 53
FEV1/FVC_PREDICTED: 85
FEV1/FVC_PERCENT_PREDICTED: 73
FEV1/FVC_PERCENT_LLN: 71
FEV1/FVC_PERCENT_CHANGE: 18
FEV1: 1.27

## 2018-01-26 NOTE — PROGRESS NOTES
Kenyetta LAW Do is a 27 y.o. female here for long-standing asthma recently on prednisone. Patient was referred by her primary care doctor.    History of Present Illness:      This young lady comes in on referral from her primary care doctor, has had asthma since childhood. She has used albuterol inhalers for years. She grew up in [place], has been in the [place] for 3 years. 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    Prior smoking stopped 6 months ago, I strongly endorsed the importance of this given her severe airflow obstruction. She has been recently on prednisone, finished a taper, and is using Ventolin. I have added Singulair 80, 2 puffs morning and 2 puffs nightly as maintenance. In addition with her allergy history I ordered an allergy panel, and placed her on Singulair 10 mg orally daily. She is not using the Flovent previously prescribed.    She also has a history of scoliosis, ordered an x-ray for her next visit to review. In addition we will do pre-and post spirometry on the additional long-acting bronchodilator, the Singulair, and she will let us know in the meantime if problems occur. With regard to health maintenance issues, smoking stopped 6 months ago and this is counseled body mass index is appropriate. Immunizations for flu vaccine are current. Recheck in 4-6 weeks, review her allergy panel, IgE levels, and response to the initiated maintenance therapy    Constitutional ROS: No unexpected change in weight, No unexplained fevers  Eyes: No change in vision or blurring or double vision  Mouth/Throat ROS: No sore throat, No recent change in voice or hoarseness  Pulmonary ROS: See present history for pertinent positives  Cardiovascular ROS: No chest pain to suggest acute coronary syndrome  Gastrointestinal ROS: No abdominal pain to suggest peptic disease  Musculoskeletal/Extremities ROS: no acute artritis or unusual swelling  Hematologic/Lymphatic ROS: No easy bleeding or unusual lymph node  "swelling  Neurologic ROS: No new or unusual weakness  Psychiatric ROS: No hallucinations  Allergic/Immunologic: No  urticaria or allergic rash      Current Outpatient Prescriptions   Medication Sig Dispense Refill   • montelukast (SINGULAIR) 10 MG Tab Take 1 Tab by mouth every bedtime. Take 1 tablet by mouth nightly at bedtime. 30 Tab 2   • budesonide-formoterol (SYMBICORT) 80-4.5 MCG/ACT Aerosol Inhale 2 Puffs by mouth 2 Times a Day. Use spacer. Rinse mouth after each use. 1 Inhaler 2   • predniSONE (DELTASONE) 20 MG Tab 2 tabs by mouth daily for 5 days 10 Tab 0   • Albuterol Sulfate 108 (90 Base) MCG/ACT AEROSOL POWDER, BREATH ACTIVATED Inhale 1-2 Puffs by mouth every 6 hours as needed. 1 Each 0     No current facility-administered medications for this visit.        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    Allergies: Patient has no known allergies.    Family History   Problem Relation Age of Onset   • Other Mother      DDD   • Asthma Father        Physical Examination    Vitals:    01/26/18 1020   Height: 1.537 m (5' 0.5\")   Weight: 47.2 kg (104 lb)   Weight % change since last entry.: 0 %   BMI (Calculated): 19.98       General Appearance: alert, no distress  Skin: Skin color, texture, turgor normal. No rashes or lesions.  Eyes: negative  Oropharynx: Lips, mucosa, and tongue normal. Teeth and gums normal. Oropharynx moist and without lesion  Lungs: positive findings: Expiratory wheezes without " accessory muscle use or prolonged phases  Heart: negative. RRR without murmur, gallop, or rubs.  No ectopy.  Abdomen: Abdomen soft, non-tender. . No masses,  No organomegaly  Extremities:  No deformities, edema, or skin discoloration  Joints: No acute arthritis  Peripheral Pulses:perfused  Neurologic: intact grossly      I (soft palate, uvula, fauces, tonsillar pillars visible)    Imaging: Ordered    PFTS: Severe airflow obstruction with bronchodilator response      Assessment and Plan  1. Severe persistent asthma with acute exacerbation  - AMB PULMONARY FUNCTION TEST/LAB  - ALLERGENS ZONE 15; Future  - CBC WITH DIFFERENTIAL; Future  - IGE SERUM; Future  - AMB SPIROMETRY; Future  - DX-CHEST-2 VIEWS; Future    2. Acute seasonal allergic rhinitis, unspecified trigger    This young lady comes in on referral from her primary care doctor, has had asthma since childhood. She has used albuterol inhalers for years. She grew up in [place], has been in the [place] for [place] years. 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    Prior smoking stopped 6 months ago, I strongly endorsed the importance of this given her severe airflow obstruction. She has been recently on prednisone, finished a taper, and is using Ventolin. I have added Singulair 80, 2 puffs morning and 2 puffs nightly as maintenance. In addition with her allergy history I ordered an allergy panel, and placed her on Singulair 10 mg orally daily. She is not using the Flovent previously prescribed.    She also has a history of scoliosis, ordered an x-ray for her next visit to review. In addition we will do pre-and post spirometry on the additional long-acting bronchodilator, the Singulair, and she will let us know in the meantime if problems occur. With regard to health maintenance issues, smoking stopped 6 months ago and this is counseled body mass index is appropriate. Immunizations for flu vaccine are current. Recheck in 4-6 weeks, review her allergy  panel, IgE levels, and response to the initiated maintenance therapy    Followup Return in about 4 weeks (around 2/23/2018) for with Chest X-ray, follow up visit with pulmonary physician.

## 2018-01-26 NOTE — PROGRESS NOTES
Progress Notes  Encounter Date: 1/26/2018 10:00 AM  Kirstie Murray M.D.   Pulmonary   Expand All Collapse All    []Hide copied text  Kenyetta LAW Do is a 27 y.o. female here for long-standing asthma recently on prednisone. Patient was referred by her primary care doctor.     History of Present Illness:       This young lady comes in on referral from her primary care doctor, has had asthma since childhood. She has used albuterol inhalers for years. She grew up in [place], has been in the [place] for [place] years. 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     Prior smoking stopped 6 months ago, I strongly endorsed the importance of this given her severe airflow obstruction. She has been recently on prednisone, finished a taper, and is using Ventolin. I have added Singulair 80, 2 puffs morning and 2 puffs nightly as maintenance. In addition with her allergy history I ordered an allergy panel, and placed her on Singulair 10 mg orally daily. She is not using the Flovent previously prescribed.     She also has a history of scoliosis, ordered an x-ray for her next visit to review. In addition we will do pre-and post spirometry on the additional long-acting bronchodilator, the Singulair, and she will let us know in the meantime if problems occur. With regard to health maintenance issues, smoking stopped 6 months ago and this is counseled body mass index is appropriate. Immunizations for flu vaccine are current. Recheck in 4-6 weeks, review her allergy panel, IgE levels, and response to the initiated maintenance therapy     Constitutional ROS: No unexpected change in weight, No unexplained fevers  Eyes: No change in vision or blurring or double vision  Mouth/Throat ROS: No sore throat, No recent change in voice or hoarseness  Pulmonary ROS: See present history for pertinent positives  Cardiovascular ROS: No chest pain to suggest acute coronary syndrome  Gastrointestinal ROS: No abdominal pain to suggest peptic  "disease  Musculoskeletal/Extremities ROS: no acute artritis or unusual swelling  Hematologic/Lymphatic ROS: No easy bleeding or unusual lymph node swelling  Neurologic ROS: No new or unusual weakness  Psychiatric ROS: No hallucinations  Allergic/Immunologic: No  urticaria or allergic rash        Current Medications          Current Outpatient Prescriptions   Medication Sig Dispense Refill   • montelukast (SINGULAIR) 10 MG Tab Take 1 Tab by mouth every bedtime. Take 1 tablet by mouth nightly at bedtime. 30 Tab 2   • budesonide-formoterol (SYMBICORT) 80-4.5 MCG/ACT Aerosol Inhale 2 Puffs by mouth 2 Times a Day. Use spacer. Rinse mouth after each use. 1 Inhaler 2   • predniSONE (DELTASONE) 20 MG Tab 2 tabs by mouth daily for 5 days 10 Tab 0   • Albuterol Sulfate 108 (90 Base) MCG/ACT AEROSOL POWDER, BREATH ACTIVATED Inhale 1-2 Puffs by mouth every 6 hours as needed. 1 Each 0      No current facility-administered medications for this visit.                     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        Allergies: Patient has no known allergies.     Family History   Family History   Problem Relation Age of Onset   • Other Mother         DDD   • Asthma Father              Physical Examination     Vitals       Vitals:     01/26/18 1020   Height: 1.537 m (5' 0.5\")   Weight: 47.2 kg (104 lb)   Weight % change since last entry.: 0 " %   BMI (Calculated): 19.98            General Appearance: alert, no distress  Skin: Skin color, texture, turgor normal. No rashes or lesions.  Eyes: negative  Oropharynx: Lips, mucosa, and tongue normal. Teeth and gums normal. Oropharynx moist and without lesion  Lungs: positive findings: Expiratory wheezes without accessory muscle use or prolonged phases  Heart: negative. RRR without murmur, gallop, or rubs.  No ectopy.  Abdomen: Abdomen soft, non-tender. . No masses,  No organomegaly  Extremities:  No deformities, edema, or skin discoloration  Joints: No acute arthritis  Peripheral Pulses:perfused  Neurologic: intact grossly        I (soft palate, uvula, fauces, tonsillar pillars visible)     Imaging: Ordered     PFTS: Severe airflow obstruction with bronchodilator response        Assessment and Plan  1. Severe persistent asthma with acute exacerbation  - AMB PULMONARY FUNCTION TEST/LAB  - ALLERGENS ZONE 15; Future  - CBC WITH DIFFERENTIAL; Future  - IGE SERUM; Future  - AMB SPIROMETRY; Future  - DX-CHEST-2 VIEWS; Future     2. Acute seasonal allergic rhinitis, unspecified trigger     This young lady comes in on referral from her primary care doctor, has had asthma since childhood. She has used albuterol inhalers for years. She grew up in [place], has been in the [place] for [place] years. 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     Prior smoking stopped 6 months ago, I strongly endorsed the importance of this given her severe airflow obstruction. She has been recently on prednisone, finished a taper, and is using Ventolin. I have added Singulair 80, 2 puffs morning and 2 puffs nightly as maintenance. In addition with her allergy history I ordered an allergy panel, and placed her on Singulair 10 mg orally daily. She is not using the Flovent previously prescribed.     She also has a history of scoliosis, ordered an x-ray for her next visit to review. In addition we will do pre-and post spirometry on the  additional long-acting bronchodilator, the Singulair, and she will let us know in the meantime if problems occur. With regard to health maintenance issues, smoking stopped 6 months ago and this is counseled body mass index is appropriate. Immunizations for flu vaccine are current. Recheck in 4-6 weeks, review her allergy panel, IgE levels, and response to the initiated maintenance therapy     Followup Return in about 4 weeks (around 2/23/2018) for with Chest X-ray, follow up visit with pulmonary physician.             Non-Provider Visit on 1/26/2018            Detailed Report

## 2018-01-26 NOTE — PATIENT INSTRUCTIONS
Progress Notes  Encounter Date: 1/26/2018 10:00 AM  Kirstie Murray M.D.   Pulmonary   Expand All Collapse All    []Hide copied text  Kenyetta LAW Do is a 27 y.o. female here for long-standing asthma recently on prednisone. Patient was referred by her primary care doctor.     History of Present Illness:       This young lady comes in on referral from her primary care doctor, has had asthma since childhood. She has used albuterol inhalers for years. She grew up in [place], has been in the [place] for 3 years. 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     Prior smoking stopped 6 months ago, I strongly endorsed the importance of this given her severe airflow obstruction. She has been recently on prednisone, finished a taper, and is using Ventolin. I have added Singulair 80, 2 puffs morning and 2 puffs nightly as maintenance. In addition with her allergy history I ordered an allergy panel, and placed her on Singulair 10 mg orally daily. She is not using the Flovent previously prescribed.     She also has a history of scoliosis, ordered an x-ray for her next visit to review. In addition we will do pre-and post spirometry on the additional long-acting bronchodilator, the Singulair, and she will let us know in the meantime if problems occur. With regard to health maintenance issues, smoking stopped 6 months ago and this is counseled body mass index is appropriate. Immunizations for flu vaccine are current. Recheck in 4-6 weeks, review her allergy panel, IgE levels, and response to the initiated maintenance therapy     Constitutional ROS: No unexpected change in weight, No unexplained fevers  Eyes: No change in vision or blurring or double vision  Mouth/Throat ROS: No sore throat, No recent change in voice or hoarseness  Pulmonary ROS: See present history for pertinent positives  Cardiovascular ROS: No chest pain to suggest acute coronary syndrome  Gastrointestinal ROS: No abdominal pain to suggest peptic  "disease  Musculoskeletal/Extremities ROS: no acute artritis or unusual swelling  Hematologic/Lymphatic ROS: No easy bleeding or unusual lymph node swelling  Neurologic ROS: No new or unusual weakness  Psychiatric ROS: No hallucinations  Allergic/Immunologic: No  urticaria or allergic rash        Current Medications          Current Outpatient Prescriptions   Medication Sig Dispense Refill   • montelukast (SINGULAIR) 10 MG Tab Take 1 Tab by mouth every bedtime. Take 1 tablet by mouth nightly at bedtime. 30 Tab 2   • budesonide-formoterol (SYMBICORT) 80-4.5 MCG/ACT Aerosol Inhale 2 Puffs by mouth 2 Times a Day. Use spacer. Rinse mouth after each use. 1 Inhaler 2   • predniSONE (DELTASONE) 20 MG Tab 2 tabs by mouth daily for 5 days 10 Tab 0   • Albuterol Sulfate 108 (90 Base) MCG/ACT AEROSOL POWDER, BREATH ACTIVATED Inhale 1-2 Puffs by mouth every 6 hours as needed. 1 Each 0      No current facility-administered medications for this visit.                     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        Allergies: Patient has no known allergies.     Family History   Family History   Problem Relation Age of Onset   • Other Mother         DDD   • Asthma Father              Physical Examination     Vitals       Vitals:     01/26/18 1020   Height: 1.537 m (5' 0.5\")   Weight: 47.2 kg (104 lb)   Weight % change since last entry.: 0 " %   BMI (Calculated): 19.98            General Appearance: alert, no distress  Skin: Skin color, texture, turgor normal. No rashes or lesions.  Eyes: negative  Oropharynx: Lips, mucosa, and tongue normal. Teeth and gums normal. Oropharynx moist and without lesion  Lungs: positive findings: Expiratory wheezes without accessory muscle use or prolonged phases  Heart: negative. RRR without murmur, gallop, or rubs.  No ectopy.  Abdomen: Abdomen soft, non-tender. . No masses,  No organomegaly  Extremities:  No deformities, edema, or skin discoloration  Joints: No acute arthritis  Peripheral Pulses:perfused  Neurologic: intact grossly        I (soft palate, uvula, fauces, tonsillar pillars visible)     Imaging: Ordered     PFTS: Severe airflow obstruction with bronchodilator response        Assessment and Plan  1. Severe persistent asthma with acute exacerbation  - AMB PULMONARY FUNCTION TEST/LAB  - ALLERGENS ZONE 15; Future  - CBC WITH DIFFERENTIAL; Future  - IGE SERUM; Future  - AMB SPIROMETRY; Future  - DX-CHEST-2 VIEWS; Future     2. Acute seasonal allergic rhinitis, unspecified trigger     This young lady comes in on referral from her primary care doctor, has had asthma since childhood. She has used albuterol inhalers for years. She grew up in [place], has been in the [place]. 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     Prior smoking stopped 6 months ago, I strongly endorsed the importance of this given her severe airflow obstruction. She has been recently on prednisone, finished a taper, and is using Ventolin. I have added Singulair 80, 2 puffs morning and 2 puffs nightly as maintenance. In addition with her allergy history I ordered an allergy panel, and placed her on Singulair 10 mg orally daily. She is not using the Flovent previously prescribed.     She also has a history of scoliosis, ordered an x-ray for her next visit to review. In addition we will do pre-and post spirometry on the  additional long-acting bronchodilator, the Singulair, and she will let us know in the meantime if problems occur. With regard to health maintenance issues, smoking stopped 6 months ago and this is counseled body mass index is appropriate. Immunizations for flu vaccine are current. Recheck in 4-6 weeks, review her allergy panel, IgE levels, and response to the initiated maintenance therapy     Followup Return in about 4 weeks (around 2/23/2018) for with Chest X-ray, follow up visit with pulmonary physician.             Non-Provider Visit on 1/26/2018            Detailed Report

## 2018-01-26 NOTE — PATIENT INSTRUCTIONS
This young lady comes in on referral from her primary care doctor, has had asthma since childhood. She has used albuterol inhalers for years. She grew up in [place], has been in the [place] for [place]. 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    Prior smoking stopped 6 months ago, I strongly endorsed the importance of this given her severe airflow obstruction. She has been recently on prednisone, finished a taper, and is using Ventolin. I have added Singulair 80, 2 puffs morning and 2 puffs nightly as maintenance. In addition with her allergy history I ordered an allergy panel, and placed her on Singulair 10 mg orally daily. She is not using the Flovent previously prescribed.    She also has a history of scoliosis, ordered an x-ray for her next visit to review. In addition we will do pre-and post spirometry on the additional long-acting bronchodilator, the Singulair, and she will let us know in the meantime if problems occur. With regard to health maintenance issues, smoking stopped 6 months ago and this is counseled body mass index is appropriate. Immunizations for flu vaccine are current. Recheck in 4-6 weeks, review her allergy panel, IgE levels, and response to the initiated maintenance therapy

## 2018-01-26 NOTE — PROCEDURES
Technician: CÉSAR Thornton    Technician Comment:  Good patient effort & cooperation.  The results of this test meet the ATS/ERS standards for acceptability & reproducibility.  Test was performed on the [employer] Body Plethysmograph-Elite DX system.  Predicted values were [facility]-3 for spirometry, [facility] for DLCO, ITS for Lung Volumes.  The DLCO was uncorrected for Hgb.  A bronchodilator of Ventolin HFA -2puffs via spacer administered.  DLCO performed during dilation period.    Interpretation:  Lung function testing completed January 26, 2018 shows severe airflow obstruction, mid flows 11% predicted. Definite bronchodilator response seen. FEV1 low at 0.87 L, 29% predicted. Severe hyperinflation present. Option transfer normal. The patient effort noted and flow volume loop demonstrates the obstructive pattern, superimposed restriction could be present given the marked hyperinflation and low normal total lung capacity.

## 2018-03-20 ENCOUNTER — HOSPITAL ENCOUNTER (EMERGENCY)
Facility: MEDICAL CENTER | Age: 28
End: 2018-03-20
Attending: EMERGENCY MEDICINE
Payer: COMMERCIAL

## 2018-03-20 ENCOUNTER — APPOINTMENT (OUTPATIENT)
Dept: RADIOLOGY | Facility: MEDICAL CENTER | Age: 28
End: 2018-03-20
Attending: EMERGENCY MEDICINE
Payer: COMMERCIAL

## 2018-03-20 VITALS
BODY MASS INDEX: 20.78 KG/M2 | TEMPERATURE: 98.4 F | HEART RATE: 89 BPM | SYSTOLIC BLOOD PRESSURE: 118 MMHG | WEIGHT: 105.82 LBS | DIASTOLIC BLOOD PRESSURE: 71 MMHG | OXYGEN SATURATION: 97 % | HEIGHT: 60 IN | RESPIRATION RATE: 18 BRPM

## 2018-03-20 DIAGNOSIS — J45.41 MODERATE PERSISTENT REACTIVE AIRWAY DISEASE WITH ACUTE EXACERBATION: ICD-10-CM

## 2018-03-20 PROCEDURE — 700111 HCHG RX REV CODE 636 W/ 250 OVERRIDE (IP): Performed by: EMERGENCY MEDICINE

## 2018-03-20 PROCEDURE — 71045 X-RAY EXAM CHEST 1 VIEW: CPT

## 2018-03-20 PROCEDURE — 700101 HCHG RX REV CODE 250: Performed by: EMERGENCY MEDICINE

## 2018-03-20 PROCEDURE — 94640 AIRWAY INHALATION TREATMENT: CPT

## 2018-03-20 PROCEDURE — 99284 EMERGENCY DEPT VISIT MOD MDM: CPT

## 2018-03-20 RX ORDER — IPRATROPIUM BROMIDE AND ALBUTEROL SULFATE 2.5; .5 MG/3ML; MG/3ML
3 SOLUTION RESPIRATORY (INHALATION) ONCE
Status: COMPLETED | OUTPATIENT
Start: 2018-03-20 | End: 2018-03-20

## 2018-03-20 RX ORDER — AZITHROMYCIN 250 MG/1
TABLET, FILM COATED ORAL
Qty: 6 TAB | Refills: 0 | Status: SHIPPED | OUTPATIENT
Start: 2018-03-20 | End: 2018-05-18

## 2018-03-20 RX ORDER — PREDNISONE 20 MG/1
40 TABLET ORAL ONCE
Status: COMPLETED | OUTPATIENT
Start: 2018-03-20 | End: 2018-03-20

## 2018-03-20 RX ORDER — ALBUTEROL SULFATE 90 UG/1
2 AEROSOL, METERED RESPIRATORY (INHALATION) EVERY 6 HOURS PRN
COMMUNITY

## 2018-03-20 RX ORDER — ALBUTEROL SULFATE 2.5 MG/3ML
2.5 SOLUTION RESPIRATORY (INHALATION) EVERY 4 HOURS PRN
Qty: 25 ML | Refills: 1 | Status: SHIPPED | OUTPATIENT
Start: 2018-03-20 | End: 2018-05-18

## 2018-03-20 RX ORDER — IPRATROPIUM BROMIDE AND ALBUTEROL SULFATE 2.5; .5 MG/3ML; MG/3ML
3 SOLUTION RESPIRATORY (INHALATION)
Status: DISCONTINUED | OUTPATIENT
Start: 2018-03-20 | End: 2018-03-20 | Stop reason: HOSPADM

## 2018-03-20 RX ORDER — PREDNISONE 20 MG/1
40 TABLET ORAL DAILY
Qty: 12 TAB | Refills: 0 | Status: SHIPPED | OUTPATIENT
Start: 2018-03-20 | End: 2018-03-26

## 2018-03-20 RX ADMIN — PREDNISONE 40 MG: 20 TABLET ORAL at 10:21

## 2018-03-20 RX ADMIN — IPRATROPIUM BROMIDE AND ALBUTEROL SULFATE 3 ML: .5; 3 SOLUTION RESPIRATORY (INHALATION) at 09:19

## 2018-03-20 RX ADMIN — IPRATROPIUM BROMIDE AND ALBUTEROL SULFATE 3 ML: .5; 3 SOLUTION RESPIRATORY (INHALATION) at 10:46

## 2018-03-20 ASSESSMENT — PAIN SCALES - WONG BAKER: WONGBAKER_NUMERICALRESPONSE: DOESN'T HURT AT ALL

## 2018-03-20 NOTE — ED NOTES
Med rec updated and complete  Allergies reviewed  Pt reports no antibiotics in the last 30 days.  Pt reports no vitamins or OTC'S.

## 2018-03-20 NOTE — DISCHARGE PLANNING
[facility] received order for DME-NEB.  [facility] met with patient bedside and completed choice form.  [facility] faxed completed choice form to HARRY Rodriguez for referral up.  [facility] confirmed patient address and contact information for home delivery of nebulizer.

## 2018-03-20 NOTE — DISCHARGE INSTRUCTIONS
Asthma Attack Prevention  Return to ER if you have any worsening of your asthma symptoms or shortness of breath. Contact your doctor for follow-up  Although there is no way to prevent asthma from starting, you can take steps to control the disease and reduce its symptoms. Learn about your asthma and how to control it. Take an active role to control your asthma by working with your health care provider to create and follow an asthma action plan. An asthma action plan guides you in:  · Taking your medicines properly.  · Avoiding things that set off your asthma or make your asthma worse (asthma triggers).  · Tracking your level of asthma control.  · Responding to worsening asthma.  · Seeking emergency care when needed.  To track your asthma, keep records of your symptoms, check your peak flow number using a handheld device that shows how well air moves out of your lungs (peak flow meter), and get regular asthma checkups.   WHAT ARE SOME WAYS TO PREVENT AN ASTHMA ATTACK?  · Take medicines as directed by your health care provider.  · Keep track of your asthma symptoms and level of control.  · With your health care provider, write a detailed plan for taking medicines and managing an asthma attack. Then be sure to follow your action plan. Asthma is an ongoing condition that needs regular monitoring and treatment.  · Identify and avoid asthma triggers. Many outdoor allergens and irritants (such as pollen, mold, cold air, and air pollution) can trigger asthma attacks. Find out what your asthma triggers are and take steps to avoid them.  · Monitor your breathing. Learn to recognize warning signs of an attack, such as coughing, wheezing, or shortness of breath. Your lung function may decrease before you notice any signs or symptoms, so regularly measure and record your peak airflow with a home peak flow meter.  · Identify and treat attacks early. If you act quickly, you are less likely to have a severe attack. You will also  need less medicine to control your symptoms. When your peak flow measurements decrease and alert you to an upcoming attack, take your medicine as instructed and immediately stop any activity that may have triggered the attack. If your symptoms do not improve, get medical help.  · Pay attention to increasing quick-relief inhaler use. If you find yourself relying on your quick-relief inhaler, your asthma is not under control. See your health care provider about adjusting your treatment.  WHAT CAN MAKE MY SYMPTOMS WORSE?  A number of common things can set off or make your asthma symptoms worse and cause temporary increased inflammation of your airways. Keep track of your asthma symptoms for several weeks, detailing all the environmental and emotional factors that are linked with your asthma. When you have an asthma attack, go back to your asthma diary to see which factor, or combination of factors, might have contributed to it. Once you know what these factors are, you can take steps to control many of them. If you have allergies and asthma, it is important to take asthma prevention steps at home. Minimizing contact with the substance to which you are allergic will help prevent an asthma attack. Some triggers and ways to avoid these triggers are:  Animal Dander:   Some people are allergic to the flakes of skin or dried saliva from animals with fur or feathers.   · There is no such thing as a hypoallergenic dog or cat breed. All dogs or cats can cause allergies, even if they don't shed.  · Keep these pets out of your home.  · If you are not able to keep a pet outdoors, keep the pet out of your bedroom and other sleeping areas at all times, and keep the door closed.  · Remove carpets and furniture covered with cloth from your home. If that is not possible, keep the pet away from fabric-covered furniture and carpets.  Dust Mites:  Many people with asthma are allergic to dust mites. Dust mites are tiny bugs that are found  in every home in mattresses, pillows, carpets, fabric-covered furniture, bedcovers, clothes, stuffed toys, and other fabric-covered items.   · Cover your mattress in a special dust-proof cover.  · Cover your pillow in a special dust-proof cover, or wash the pillow each week in hot water. Water must be hotter than 130° F (54.4° C) to kill dust mites. Cold or warm water used with detergent and bleach can also be effective.  · Wash the sheets and blankets on your bed each week in hot water.  · Try not to sleep or lie on cloth-covered cushions.  · Call ahead when traveling and ask for a smoke-free hotel room. Bring your own bedding and pillows in case the hotel only supplies feather pillows and down comforters, which may contain dust mites and cause asthma symptoms.  · Remove carpets from your bedroom and those laid on concrete, if you can.  · Keep stuffed toys out of the bed, or wash the toys weekly in hot water or cooler water with detergent and bleach.  Cockroaches:  Many people with asthma are allergic to the droppings and remains of cockroaches.   · Keep food and garbage in closed containers. Never leave food out.  · Use poison baits, traps, powders, gels, or paste (for example, boric acid).  · If a spray is used to kill cockroaches, stay out of the room until the odor goes away.  Indoor Mold:  · Fix leaky faucets, pipes, or other sources of water that have mold around them.  · Clean floors and moldy surfaces with a fungicide or diluted bleach.  · Avoid using humidifiers, vaporizers, or swamp coolers. These can spread molds through the air.  Pollen and Outdoor Mold:  · When pollen or mold spore counts are high, try to keep your windows closed.  · Stay indoors with windows closed from late morning to afternoon. Pollen and some mold spore counts are highest at that time.  · Ask your health care provider whether you need to take anti-inflammatory medicine or increase your dose of the medicine before your allergy  season starts.  Other Irritants to Avoid:  · Tobacco smoke is an irritant. If you smoke, ask your health care provider how you can quit. Ask family members to quit smoking, too. Do not allow smoking in your home or car.  · If possible, do not use a wood-burning stove, kerosene heater, or fireplace. Minimize exposure to all sources of smoke, including incense, candles, fires, and fireworks.  · Try to stay away from strong odors and sprays, such as perfume, talcum powder, hair spray, and paints.  · Decrease humidity in your home and use an indoor air cleaning device. Reduce indoor humidity to below 60%. Dehumidifiers or central air conditioners can do this.  · Decrease house dust exposure by changing furnace and air cooler filters frequently.  · Try to have someone else vacuum for you once or twice a week. Stay out of rooms while they are being vacuumed and for a short while afterward.  · If you vacuum, use a dust mask from a hardware store, a double-layered or microfilter vacuum  bag, or a vacuum  with a HEPA filter.  · Sulfites in foods and beverages can be irritants. Do not drink beer or wine or eat dried fruit, processed potatoes, or shrimp if they cause asthma symptoms.  · Cold air can trigger an asthma attack. Cover your nose and mouth with a scarf on cold or windy days.  · Several health conditions can make asthma more difficult to manage, including a runny nose, sinus infections, reflux disease, psychological stress, and sleep apnea. Work with your health care provider to manage these conditions.  · Avoid close contact with people who have a respiratory infection such as a cold or the flu, since your asthma symptoms may get worse if you catch the infection. Wash your hands thoroughly after touching items that may have been handled by people with a respiratory infection.  · Get a flu shot every year to protect against the flu virus, which often makes asthma worse for days or weeks. Also get a  pneumonia shot if you have not previously had one. Unlike the flu shot, the pneumonia shot does not need to be given yearly.  Medicines:  · Talk to your health care provider about whether it is safe for you to take aspirin or non-steroidal anti-inflammatory medicines (NSAIDs). In a small number of people with asthma, aspirin and NSAIDs can cause asthma attacks. These medicines must be avoided by people who have known aspirin-sensitive asthma. It is important that people with aspirin-sensitive asthma read labels of all over-the-counter medicines used to treat pain, colds, coughs, and fever.  · Beta-blockers and ACE inhibitors are other medicines you should discuss with your health care provider.  HOW CAN I FIND OUT WHAT I AM ALLERGIC TO?  Ask your asthma health care provider about allergy skin testing or blood testing (the RAST test) to identify the allergens to which you are sensitive. If you are found to have allergies, the most important thing to do is to try to avoid exposure to any allergens that you are sensitive to as much as possible. Other treatments for allergies, such as medicines and allergy shots (immunotherapy) are available.   CAN I EXERCISE?  Follow your health care provider's advice regarding asthma treatment before exercising. It is important to maintain a regular exercise program, but vigorous exercise or exercise in cold, humid, or dry environments can cause asthma attacks, especially for those people who have exercise-induced asthma.     This information is not intended to replace advice given to you by your health care provider. Make sure you discuss any questions you have with your health care provider.     Document Released: 12/06/2010 Document Revised: 12/23/2014 Document Reviewed: 06/25/2014  [employer] Interactive Patient Education ©2016 [employer]

## 2018-03-20 NOTE — ED NOTES
Written and verbal discharge instructions reviewed with pt and family, verbalized understanding. Vital signs WNL. Pt ambulated without difficulty to discharge.

## 2018-03-20 NOTE — FACE TO FACE
Face to Face Note  -  Durable Medical Equipment    Jordan De Souza M.D. - NPI: [id number]  I certify that this patient is under my care and that they had a durable medical equipment(DME)face to face encounter by myself that meets the physician DME face-to-face encounter requirements with this patient on:    Date of encounter:   Patient:                    MRN:                       YOB: 2016  Kenyetta Y   [id number]  1990     The encounter with the patient was in whole, or in part, for the following medical condition, which is the primary reason for durable medical equipment:  Asthma    I certify that, based on my findings, the following durable medical equipment is medically necessary:  Nebulizer.    HOME O2 Saturation Measurements:(Values must be present for Home Oxygen orders)         ,     ,         My Clinical findings support the need for the above equipment due to:  Wheezing (Chronic)    Supporting Symptoms: Persistent wheezing despite breathing treatments in the emergency department

## 2018-03-20 NOTE — ED NOTES
Pt amb to rm#7a; c/o diff breathing; hx asthma. Pt last used albuterol inhaler at 07 this am. Pt states that she had 'trouble breathing' thrughut the night and used albuterol inhaler almost 'every hour'. Pt states her asthma is usually well-controlled w alb inhaler prn. Pt able to speak 3-5 word sentences at this time. Pt place dn 022L via nc for comfort.

## 2018-03-20 NOTE — FLOWSHEET NOTE
03/20/18 1046   Events/Summary/Plan   Events/Summary/Plan 2nd tx given inn ER   Non-Invasive Resp Device Site Inspection Completed Intact   Interdisciplinary Plan of Care-Goals (Indications)   Obstructive Ventilatory Defect or Pulmonary Disease without Obvious Obstruction Physical Exam / Hyperinflation / Wheezing (bronchospasm)   Interdisciplinary Plan of Care-Outcomes    Bronchodilator Outcome Diminished Wheezing and Volume of Air Movement Increased   Education   Education Yes - Pt. / Family has been Instructed in use of Respiratory Medications and Adverse Reactions   RT Assessment of Delivered Medications   Evaluation of Medication Delivery Daily Yes-- Pt /Family has been Instructed in use of Respiratory Medications and Adverse Reactions   SVN Group   #SVN Performed Yes   Given By: Mouthpiece   Respiratory WDL   Respiratory (WDL) X   Chest Exam   Work Of Breathing / Effort Mild   Respiration 18   Pulse 89   Breath Sounds   Pre/Post Intervention Post Intervention Assessment  (increased wheezing, air movement improved post tyx)   RUL Breath Sounds Expiratory Wheezes   RML Breath Sounds Expiratory Wheezes   RLL Breath Sounds Expiratory Wheezes   GA Breath Sounds Expiratory Wheezes   LLL Breath Sounds Expiratory Wheezes   Secretions   Cough Non Productive;Congested   How Sputum Obtained Spontaneous   Oximetry   Continuous Oximetry Yes   Oxygen   Home O2 Use Prior To Admission? No   Pulse Oximetry 97 %   O2 (LPM) 2   O2 Daily Delivery Respiratory  Silicone Nasal Cannula

## 2018-03-20 NOTE — ED PROVIDER NOTES
ED Provider Note    CHIEF COMPLAINT  Chief Complaint   Patient presents with   • Difficulty Breathing     hx asthma       HPI  Kenyetta LAW Do is a 27 y.o. female who presents with shortness of breath. She has history of asthma and feels like an exacerbation. She took her last steroid yesterday. She's had this in the past last time she was like this was about a month ago. She is to smoke but quit a long time ago. She does have some runny nose no cough no fever no chills no nausea vomiting diarrhea no abdominal pain. 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   • Scoliosis     Mild Dextroconvex Scoliosis per New Patient Packet       FAMILY HISTORY  Family History   Problem Relation Age of Onset   • Other Mother      DDD   • Asthma Father        SOCIAL HISTORY  Social History     Social History   • Marital status: Single     Spouse name: N/A   • Number of children: N/A   • Years of education: N/A     Social History Main Topics   • Smoking status: Former Smoker     Packs/day: 0.25     Years: 1.00     Types: Cigarettes     Quit date: 10/1/2017   • Smokeless tobacco: Never Used      Comment: per patient: 1/3 pack per day   • Alcohol use 0.0 - 1.2 oz/week      Comment: Rarely   • Drug use: No   • Sexual activity: Yes     Birth control/ protection: IUD     Other Topics Concern   • Not on file     Social History Narrative   • No narrative on file       SURGICAL HISTORY  History reviewed. No pertinent surgical history.    CURRENT MEDICATIONS  Home Medications    **Home medications have not yet been reviewed for this encounter**         ALLERGIES  No Known Allergies    PHYSICAL EXAM  VITAL SIGNS: /72   Pulse 96   Temp 36.9 °C (98.4 °F)   Resp 18   Ht 1.524 m (5')   Wt 48 kg (105 lb 13.1 oz)   LMP  03/13/2018   SpO2 98%   BMI 20.67 kg/m²     Constitutional: Patient is alert and oriented x3 in moderate respiratory distress   HENT: Moist mucous membranes pharynx is clear  Eyes: No conjunctivitis or icterus  Neck: Trach is midline no palpable thyroid  Lymphatic: Negative at her cervical adenopathy  Cardiovascular: Normal heart rate   Thorax & Lungs: Diffuse wheezing throughout all lung fields expiratory phase  Abdomen: Nontender  Neurologic: Normal motor sensation  Extremities: No edema  Psychiatric: Affect normal, Judgment normal, Mood normal.     DX-CHEST-PORTABLE (1 VIEW)   Final Result      Mild right basilar opacity is worrisome for pneumonia              COURSE & MEDICAL DECISION MAKING  Pertinent Labs & Imaging studies reviewed. (See chart for details)  Patient was given a nebulizer treatment with some improvement persistent coarse wheezing. The patient has no respiratory distress at this time she received a 2nd treatment was still coarse wheezes. She does want to go home does not want to be admitted her oxygenation is normal. She may have a slight pneumonia so I am placing her on a azithromycin. I filled out appropriate forms and have her have arrangements made to have a nebulizer delivered to her house. We placed on prednisone for 6 days given return precautions and follow-up    FINAL IMPRESSION  1.   1. Moderate persistent reactive airway disease with acute exacerbation        2.   3.         Electronically signed by: Jordan De Souza, 3/20/2018 9:42 AM

## 2018-03-20 NOTE — FLOWSHEET NOTE
03/20/18 0919   Events/Summary/Plan   Events/Summary/Plan Tx given   Non-Invasive Resp Device Site Inspection Completed Intact   Interdisciplinary Plan of Care-Goals (Indications)   Obstructive Ventilatory Defect or Pulmonary Disease without Obvious Obstruction Physical Exam / Hyperinflation / Wheezing (bronchospasm)   Interdisciplinary Plan of Care-Outcomes    Bronchodilator Outcome Diminished Wheezing and Volume of Air Movement Increased   Education   Education Yes - Pt. / Family has been Instructed in use of Respiratory Medications and Adverse Reactions   RT Assessment of Delivered Medications   Evaluation of Medication Delivery Daily Yes-- Pt /Family has been Instructed in use of Respiratory Medications and Adverse Reactions   SVN Group   #SVN Performed Yes   Given By: Mouthpiece   Date SVN Last Changed 03/20/18   Date SVN Next Change Due (Q 7 Days) 03/27/18   Respiratory WDL   Respiratory (WDL) X   Chest Exam   Work Of Breathing / Effort Increased Work of Breathing;Speech Limiting/Unable to complete full sentence;Mild;Accessory Muscle Use   Respiration 18   Pulse 96   Breath Sounds   Pre/Post Intervention Pre Intervention Assessment   RUL Breath Sounds Inspiratory Wheezes;Expiratory Wheezes   RML Breath Sounds Inspiratory Wheezes;Expiratory Wheezes   RLL Breath Sounds Inspiratory Wheezes;Expiratory Wheezes   GA Breath Sounds Inspiratory Wheezes;Expiratory Wheezes   LLL Breath Sounds Inspiratory Wheezes;Expiratory Wheezes   Secretions   Cough Non Productive;Congested;Moist   Oximetry   Continuous Oximetry Yes   Oxygen   Home O2 Use Prior To Admission? No   Pulse Oximetry 98 %   O2 (LPM) 2   O2 Daily Delivery Respiratory  Silicone Nasal Cannula

## 2018-03-21 ENCOUNTER — PATIENT OUTREACH (OUTPATIENT)
Dept: HEALTH INFORMATION MANAGEMENT | Facility: OTHER | Age: 28
End: 2018-03-21

## 2018-03-28 ENCOUNTER — OFFICE VISIT (OUTPATIENT)
Dept: PULMONOLOGY | Facility: HOSPICE | Age: 28
End: 2018-03-28
Payer: COMMERCIAL

## 2018-03-28 ENCOUNTER — NON-PROVIDER VISIT (OUTPATIENT)
Dept: PULMONOLOGY | Facility: HOSPICE | Age: 28
End: 2018-03-28
Payer: COMMERCIAL

## 2018-03-28 ENCOUNTER — APPOINTMENT (OUTPATIENT)
Dept: RADIOLOGY | Facility: IMAGING CENTER | Age: 28
End: 2018-03-28
Attending: INTERNAL MEDICINE
Payer: COMMERCIAL

## 2018-03-28 VITALS
WEIGHT: 103 LBS | SYSTOLIC BLOOD PRESSURE: 100 MMHG | OXYGEN SATURATION: 97 % | HEART RATE: 64 BPM | HEIGHT: 60 IN | BODY MASS INDEX: 20.22 KG/M2 | DIASTOLIC BLOOD PRESSURE: 78 MMHG | RESPIRATION RATE: 14 BRPM | TEMPERATURE: 97.9 F

## 2018-03-28 DIAGNOSIS — J45.51 SEVERE PERSISTENT ASTHMA WITH ACUTE EXACERBATION: ICD-10-CM

## 2018-03-28 DIAGNOSIS — J45.40 MODERATE PERSISTENT REACTIVE AIRWAY DISEASE WITHOUT COMPLICATION: ICD-10-CM

## 2018-03-28 PROCEDURE — 94060 EVALUATION OF WHEEZING: CPT | Performed by: INTERNAL MEDICINE

## 2018-03-28 PROCEDURE — 99214 OFFICE O/P EST MOD 30 MIN: CPT | Performed by: INTERNAL MEDICINE

## 2018-03-28 RX ORDER — BUDESONIDE AND FORMOTEROL FUMARATE DIHYDRATE 80; 4.5 UG/1; UG/1
2 AEROSOL RESPIRATORY (INHALATION) 2 TIMES DAILY
Qty: 1 INHALER | Refills: 2 | Status: SHIPPED | OUTPATIENT
Start: 2018-03-28 | End: 2018-05-18

## 2018-03-28 RX ORDER — METHYLPREDNISOLONE 4 MG/1
TABLET ORAL
COMMUNITY
Start: 2018-01-13

## 2018-03-28 ASSESSMENT — PULMONARY FUNCTION TESTS
FEV1_PREDICTED: 2.88
FVC_PREDICTED: 3.31
FEV1_PREDICTED: 52
FEV1_PERCENT_CHANGE: 33
FEV1/FVC: 60
FEV1_PERCENT_CHANGE: 6
FEV1/FVC_PREDICTED: 87.01
FEV1/FVC_PERCENT_PREDICTED: 68
FEV1/FVC_PERCENT_CHANGE: 550
FVC_PERCENT_PREDICTED: 76
FVC: 2.53
FEV1: 2.03
FEV1/FVC: 75.19
FVC: 2.7
FEV1: 1.52

## 2018-03-28 NOTE — PATIENT INSTRUCTIONS
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 She has asthma, episodic symptoms that require rescue inhaler. Sometimes this is effective, sometimes not.    She is very difficult for me to sort out, today she tells me she feels \"really good. However we did spirometry and her mid flows are only 25% predicted. She had a dramatic bronchodilator response. However she has no wheezing on exam today. I reviewed the spirometry, and then went back and reviewed her lung function testing on the computer from January, she actually was fairly consistent and at that time her mid flows were only 11% predicted, with bronchodilator response. Hyperinflation is noted.    One week ago she went to the emergency room with atypical chest pain, had a \"cold\", but those symptoms resolved an x-ray done there was clear.    I'm still not certain that she has the severity of asthma identified on the spirometry, airflow seems better than the spirometry and lungs today are clear. However with the hyperinflation and these findings, I have ordered an allergy panel, IgE level, and we will see her back in one month. I've asked her to see the nurse practitioner in follow-up, to review the laboratory studies and her response to Symbicort 80 initiated today.  "

## 2018-03-28 NOTE — PROGRESS NOTES
"Kenyetta LAW Do is a 27 y.o. female here for asthma with recent emergency room visit. Patient was referred by her primary care doctor.    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 She has asthma, episodic symptoms that require rescue inhaler. Sometimes this is effective, sometimes not.    She is very difficult for me to sort out, today she tells me she feels \"really good. However we did spirometry and her mid flows are only 25% predicted. She had a dramatic bronchodilator response. However she has no wheezing on exam today. I reviewed the spirometry, and then went back and reviewed her lung function testing on the computer from January, she actually was fairly consistent and at that time her mid flows were only 11% predicted, with bronchodilator response. Hyperinflation is noted.    One week ago she went to the emergency room with atypical chest pain, had a \"cold\", but those symptoms resolved an x-ray done there was clear.    I'm still not certain that she has the severity of asthma identified on the spirometry, airflow seems better than the spirometry and lungs today are clear. However with the hyperinflation and these findings, I have ordered an allergy panel, IgE level, and we will see her back in one month. I've asked her to see the nurse practitioner in follow-up, to review the laboratory studies and her response to Symbicort 80 initiated today.  Constitutional ROS: No unexpected change in weight, No unexplained fevers  Eyes: No change in vision or blurring or double vision  Mouth/Throat ROS: No sore throat, No recent change in voice or hoarseness  Pulmonary ROS: See present history for pertinent positives  Cardiovascular ROS: No chest pain to suggest acute coronary syndrome  Gastrointestinal ROS: No abdominal pain to suggest peptic disease  Musculoskeletal/Extremities ROS: no acute artritis or unusual swelling  Hematologic/Lymphatic ROS: No " easy bleeding or unusual lymph node swelling  Neurologic ROS: No new or unusual weakness  Psychiatric ROS: No hallucinations  Allergic/Immunologic: No  urticaria or allergic rash      Current Outpatient Prescriptions   Medication Sig Dispense Refill   • budesonide-formoterol (SYMBICORT) 80-4.5 MCG/ACT Aerosol Inhale 2 Puffs by mouth 2 Times a Day. Use spacer. Rinse mouth after each use. 1 Inhaler 2   • albuterol (VENTOLIN HFA) 108 (90 Base) MCG/ACT Aero Soln inhalation aerosol Inhale 2 Puffs by mouth every 6 hours as needed for Shortness of Breath.     • albuterol (PROVENTIL) 2.5mg/3ml Nebu Soln solution for nebulization 3 mL by Nebulization route every four hours as needed for Shortness of Breath. 25 mL 1   • MethylPREDNISolone (MEDROL DOSEPAK) 4 MG Tablet Therapy Pack      • azithromycin (ZITHROMAX) 250 MG Tab 2 po now then 1 po q day for 4 days 6 Tab 0     No current facility-administered medications for this visit.        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 No pertinent surgical history.    Allergies: Patient has no known allergies.    Family History   Problem Relation Age of Onset   • Other Mother      DDD   • Asthma Father        Physical Examination    Vitals:    03/28/18 1412   Height: 1.524 m (5')   Weight: 46.7 kg (103 lb)   Weight % change since last entry.: 0 %   BP: 100/78   Pulse: 64   BMI (Calculated): 20.12   Resp: 14   Temp: 36.6 °C (97.9 °F)       General Appearance: alert, no distress  Skin:  "Skin color, texture, turgor normal. No rashes or lesions.  Eyes: negative  Oropharynx: Lips, mucosa, and tongue normal. Teeth and gums normal. Oropharynx moist and without lesion  Lungs: positive findings: Remarkably clear without wheezes, or reduced expiratory flow  Heart: negative. RRR without murmur, gallop, or rubs.  No ectopy.  Abdomen: Abdomen soft, non-tender. . No masses,  No organomegaly  Extremities:  No deformities, edema, or skin discoloration  Joints: No acute arthritis  Peripheral Pulses:perfused  Neurologic: intact grossly      I (soft palate, uvula, fauces, tonsillar pillars visible)    Imaging: Described above    PFTS: Described above      Assessment and Plan  1. 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 She has asthma, episodic symptoms that require rescue inhaler. Sometimes this is effective, sometimes not.    She is very difficult for me to sort out, today she tells me she feels \"really good. However we did spirometry and her mid flows are only 25% predicted. She had a dramatic bronchodilator response. However she has no wheezing on exam today. I reviewed the spirometry, and then went back and reviewed her lung function testing on the computer from January, she actually was fairly consistent and at that time her mid flows were only 11% predicted, with bronchodilator response. Hyperinflation is noted.    One week ago she went to the emergency room with atypical chest pain, had a \"cold\", but those symptoms resolved an x-ray done there was clear.    I'm still not certain that she has the severity of asthma identified on the spirometry, airflow seems better than the spirometry and lungs today are clear. However with the hyperinflation and these findings, I have ordered an allergy panel, IgE level, and we will see her back in one month. I've asked her to see the nurse practitioner in follow-up, " to review the laboratory studies and her response to Symbicort 80 initiated today.  Followup Return in about 4 weeks (around 4/25/2018) for follow up visit with FLOR.

## 2018-04-06 ENCOUNTER — HOSPITAL ENCOUNTER (OUTPATIENT)
Dept: LAB | Facility: MEDICAL CENTER | Age: 28
End: 2018-04-06
Attending: INTERNAL MEDICINE
Payer: COMMERCIAL

## 2018-04-06 DIAGNOSIS — J45.51 SEVERE PERSISTENT ASTHMA WITH ACUTE EXACERBATION: ICD-10-CM

## 2018-04-06 LAB
BASOPHILS # BLD AUTO: 0.8 % (ref 0–1.8)
BASOPHILS # BLD: 0.06 K/UL (ref 0–0.12)
EOSINOPHIL # BLD AUTO: 0.54 K/UL (ref 0–0.51)
EOSINOPHIL NFR BLD: 7.2 % (ref 0–6.9)
ERYTHROCYTE [DISTWIDTH] IN BLOOD BY AUTOMATED COUNT: 43.2 FL (ref 35.9–50)
HCT VFR BLD AUTO: 43.5 % (ref 37–47)
HGB BLD-MCNC: 14.3 G/DL (ref 12–16)
IMM GRANULOCYTES # BLD AUTO: 0.01 K/UL (ref 0–0.11)
IMM GRANULOCYTES NFR BLD AUTO: 0.1 % (ref 0–0.9)
LYMPHOCYTES # BLD AUTO: 1.97 K/UL (ref 1–4.8)
LYMPHOCYTES NFR BLD: 26.2 % (ref 22–41)
MCH RBC QN AUTO: 30 PG (ref 27–33)
MCHC RBC AUTO-ENTMCNC: 32.9 G/DL (ref 33.6–35)
MCV RBC AUTO: 91.2 FL (ref 81.4–97.8)
MONOCYTES # BLD AUTO: 0.47 K/UL (ref 0–0.85)
MONOCYTES NFR BLD AUTO: 6.3 % (ref 0–13.4)
NEUTROPHILS # BLD AUTO: 4.46 K/UL (ref 2–7.15)
NEUTROPHILS NFR BLD: 59.4 % (ref 44–72)
NRBC # BLD AUTO: 0 K/UL
NRBC BLD-RTO: 0 /100 WBC
PLATELET # BLD AUTO: 240 K/UL (ref 164–446)
PMV BLD AUTO: 10.9 FL (ref 9–12.9)
RBC # BLD AUTO: 4.77 M/UL (ref 4.2–5.4)
WBC # BLD AUTO: 7.5 K/UL (ref 4.8–10.8)

## 2018-04-06 PROCEDURE — 36415 COLL VENOUS BLD VENIPUNCTURE: CPT

## 2018-04-06 PROCEDURE — 85025 COMPLETE CBC W/AUTO DIFF WBC: CPT

## 2018-04-06 PROCEDURE — 86003 ALLG SPEC IGE CRUDE XTRC EA: CPT | Mod: 91

## 2018-04-06 PROCEDURE — 82785 ASSAY OF IGE: CPT

## 2018-04-09 LAB
A ALTERNATA IGE QN: <0.1 KU/L
A FUMIGATUS IGE QN: <0.1 KU/L
BERMUDA GRASS IGE QN: 0.32 KU/L
BOXELDER IGE QN: 0.29 KU/L
C SPHAEROSPERMUM IGE QN: <0.1 KU/L
CAT DANDER IGE QN: <0.1 KU/L
CMN PIGWEED IGE QN: 0.17 KU/L
COMMON RAGWEED IGE QN: 0.28 KU/L
COTTONWOOD IGE QN: 0.18 KU/L
COW MILK IGE QN: <0.1 KU/L
D FARINAE IGE QN: 8.8 KU/L
D PTERONYSS IGE QN: 7.83 KU/L
DEPRECATED MISC ALLERGEN IGE RAST QL: ABNORMAL
DOG DANDER IGE QN: <0.1 KU/L
IGE SERPL-ACNC: 166 KU/L
M RACEMOSUS IGE QN: <0.1 KU/L
MOUSE EPITH IGE QN: 6.6 KU/L
MT JUNIPER IGE QN: 0.23 KU/L
MUGWORT IGE QN: 0.19 KU/L
OLIVE POLN IGE QN: 0.21 KU/L
P NOTATUM IGE QN: <0.1 KU/L
PEANUT IGE QN: 0.22 KU/L
ROACH IGE QN: 0.53 KU/L
SALTWORT IGE QN: 0.22 KU/L
TIMOTHY IGE QN: 0.34 KU/L
WHITE ELM IGE QN: 0.3 KU/L
WHITE MULBERRY IGE QN: 0.15 KU/L
WHITE OAK IGE QN: 0.32 KU/L

## 2018-05-18 ENCOUNTER — NON-PROVIDER VISIT (OUTPATIENT)
Dept: PULMONOLOGY | Facility: HOSPICE | Age: 28
End: 2018-05-18
Payer: COMMERCIAL

## 2018-05-18 ENCOUNTER — OFFICE VISIT (OUTPATIENT)
Dept: PULMONOLOGY | Facility: HOSPICE | Age: 28
End: 2018-05-18
Payer: COMMERCIAL

## 2018-05-18 VITALS
BODY MASS INDEX: 20.22 KG/M2 | WEIGHT: 103 LBS | DIASTOLIC BLOOD PRESSURE: 80 MMHG | HEART RATE: 78 BPM | TEMPERATURE: 97.5 F | SYSTOLIC BLOOD PRESSURE: 110 MMHG | OXYGEN SATURATION: 96 % | RESPIRATION RATE: 16 BRPM | HEIGHT: 60 IN

## 2018-05-18 VITALS — BODY MASS INDEX: 20.51 KG/M2 | WEIGHT: 105 LBS

## 2018-05-18 DIAGNOSIS — G47.10 HYPERSOMNOLENCE: ICD-10-CM

## 2018-05-18 DIAGNOSIS — J45.41 MODERATE PERSISTENT ASTHMA WITH ACUTE EXACERBATION: ICD-10-CM

## 2018-05-18 DIAGNOSIS — J45.40 MODERATE PERSISTENT ASTHMA WITHOUT COMPLICATION: ICD-10-CM

## 2018-05-18 DIAGNOSIS — J45.40 MODERATE PERSISTENT REACTIVE AIRWAY DISEASE WITHOUT COMPLICATION: ICD-10-CM

## 2018-05-18 DIAGNOSIS — R06.89 GASPING FOR BREATH: ICD-10-CM

## 2018-05-18 PROCEDURE — 90670 PCV13 VACCINE IM: CPT | Performed by: NURSE PRACTITIONER

## 2018-05-18 PROCEDURE — 90471 IMMUNIZATION ADMIN: CPT | Performed by: NURSE PRACTITIONER

## 2018-05-18 PROCEDURE — 94060 EVALUATION OF WHEEZING: CPT | Performed by: INTERNAL MEDICINE

## 2018-05-18 PROCEDURE — 99214 OFFICE O/P EST MOD 30 MIN: CPT | Mod: 25 | Performed by: NURSE PRACTITIONER

## 2018-05-18 ASSESSMENT — PULMONARY FUNCTION TESTS
FEV1_PREDICTED: 47
FVC: 2.54
FVC_PERCENT_PREDICTED: 75
FEV1_PERCENT_PREDICTED: 61
FEV1/FVC: 60
FEV1/FVC: 71.26
FEV1_PREDICTED: 2.93
FVC: 2.31
FEV1: 1.81
FEV1/FVC_PERCENT_PREDICTED: 69
FVC_PREDICTED: 3.37
FEV1/FVC_PREDICTED: 86.94
FEV1_PERCENT_CHANGE: 10
FVC_PERCENT_PREDICTED: 68
FEV1_PERCENT_CHANGE: 29
FEV1/FVC_PERCENT_PREDICTED: 81
FEV1/FVC_PERCENT_CHANGE: 290
FEV1: 1.39

## 2018-05-18 NOTE — PROGRESS NOTES
CC:  Here for f/u sleep and pulmonary issues as listed below    HPI:   Kenyetta presents today for follow up for asthma and sick visit and fannie results.  Spirometry from 5/2018 indicate a Fev1 of 1.39L or 47% predicted with a positive bronchodilator response, Fev1/FVC ratio of 60.  Her last x-ray was completed March 20, 2018 which are reviewed results noting mild right basilar opacity, worrisome for pneumonia and required an antibiotic that she found helpful.  An updated chest has not been completed, although ordered.    Patient is a student in [employer].  There is a slight language barrier.  [place] from [place].  She moved to [place] 2 years ago first to [place], then to [place], then to [place].  She found her breathing to be best in [place], but the worst in [place].  She has no family history of asthma or pulmonary disease.  Last office visit she was seen by Dr. Murray who started her on Symbicort 80 last office visit, but she has only been taking it before bed, and of course not finding it helpful.  She uses her rescue inhaler approximately 4 times a day with benefit.  Reviewed the difference and importance of maintenance therapy versus rescue therapy.  Patient reports shortness of breath at rest and with walking that is worse, wheezing, gasping in the middle of the night, wheezing in the middle of the night.      Patient is currently sleeping 8 hours per night. They have no trouble falling asleep.   They do not feel refreshed in the morning and denies morning H/A. They feel tired throughout the day and denies naps.  Patient reports snoring, apnea events and paroxysmal nocturnal dyspnea events. They have never fallen asleep in conversation, at the wheel, or at work.  They deny sleepwalking/talking.         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 No pertinent surgical history.    Family History   Problem Relation Age of Onset   • Other Mother      DDD   • Asthma Father        Social History     Social History   • Marital status: Single     Spouse name: N/A   • Number of children: N/A   • Years of education: N/A     Occupational History   • Not on file.     Social History Main Topics   • Smoking status: Former Smoker     Packs/day: 0.25     Years: 1.00     Types: Cigarettes     Quit date: 10/1/2017   • Smokeless tobacco: Never Used      Comment: per patient: 1/3 pack per day   • Alcohol use No   • Drug use: No   • Sexual activity: Yes     Birth control/ protection: IUD     Other Topics Concern   • Not on file     Social History Narrative   • No narrative on file       Current Outpatient Prescriptions   Medication Sig Dispense Refill   • DiphenhydrAMINE HCl (BENADRYL ALLERGY PO) Take  by mouth.     • Fluticasone Furoate-Vilanterol (BREO ELLIPTA) 200-25 MCG/INH AEROSOL POWDER, BREATH ACTIVATED Inhale 1 Puff by mouth every day. Rinse mouth after use. 1 Each 3   • albuterol (VENTOLIN HFA) 108 (90 Base) MCG/ACT Aero Soln inhalation aerosol Inhale 2 Puffs by mouth every 6 hours as needed for Shortness of Breath.     • MethylPREDNISolone (MEDROL DOSEPAK) 4 MG Tablet Therapy Pack        No current facility-administered medications for this visit.           Allergies: Patient has no known allergies.      ROS   Gen: Denies fever, chills, unintentional weight loss, fatigue, night sweats  E/N/T: Denies ear pain, nasal congestion  Resp:Denies Dyspnea,  wheezing, cough, sputum production, hemoptysis  CV: Denies chest pain, chest tightness, palpitations, BLE edema  Sleep:Denies morning headache, insomnia, daytime somnolence, snoring, gasping for air, apnea  Neuro: Denies frequent headaches, weakness, dizziness  GI: Denies N/V, acid reflux/heartburn  See HPI.  All other systems reviewed and negative      Vital signs for this encounter:  Vitals:    05/18/18 0944   Height: 1.524 m (5')   Weight: 46.7 kg (103 lb)   Weight % change since last entry.: 0 %   BP: 110/80   Pulse: 78   BMI (Calculated): 20.12   Resp: 16   Temp: 36.4 °C (97.5 °F)   O2 sat % room air: 96 %                 Physical Exam:   Appearance: well developed, well nourished, no acute distress.  Eyes: PERRL, EOM intact, sclere white, conjunctiva moist.  Ears: no lesions or deformities.  Hearing: grossly intact.  Nose: no lesions or deformities.  Dentition: good dentition.  Oropharynx: tongue normal, posterior pharynx without erythema or exudate.  Neck: supple, trachea midline, no masses.  Respiratory effort: no intercostal retractions or use of accessory muscles.  Lung auscultation: Bilateral diminished with exp wheeze that does not clear with cough  Heart auscultation: no murmur, rub, or gallop.   Extremities: no cyanosis or edema.  Abdomen: soft, non-tender, no masses.  Gait and station: grossly normal   Digits and Nails: no clubbing, cyanosis, petechiae, or nodes.  Cranial nerves: grossly normal.  Motor: no focal deficits observed.  Skin: no rashes, lesions, or ulcers noted.  Orientation: oriented to time, place, and person.  Mood and affect: mood and affect appropriate, normal interaction with examiner.    Assessment   1. Gasping for breath  POLYSOMNOGRAPHY, 4 OR MORE   2. Hypersomnolence  POLYSOMNOGRAPHY, 4 OR MORE   3. Moderate persistent asthma with acute exacerbation  REFERRAL TO ALLERGY    AMB SPIROMETRY    EOSINOPHIL COUNT    ALLERGENS (11) FOOD    PNEUMOCOCCAL CONJUGATE VACCINE 13-VALENT    AMB  SPIROMETRY    CT-CHEST (THORAX) W/O    SMALL VOLUME NEBULIZER    SMALL VOLUME NEBULIZER   4. Moderate persistent reactive airway disease without complication         Patient was seen for 40 minutes, more than 50% of time spent in face to face review, counseling, and arranging future evaluation and follow up of medical conditions and care of uncontrolled asthma. On assessment she was tight and had difficulty inhaling. Completed Fannie, then gave nebulizer in office with great improvement and started moving air well, completed fannie again with great improvement. Reviewed in great detail the importance of maintenance therapy. I believe there is a language barrier and gave her ample time for questions she had and answered to the best of my ability.     PLAN:   Patient Instructions   1) Start Breo 200-25 1 puff, once a day with mouth rinse  2) Continue rescue inhaler and nebulizer as needed  3) Referral to allergist with starting work up with lab testing  4) Sleep study   5) Start using Singulair in the evening  6) Vaccines: Up to date with Prevnar 13 today  7) CT of chest for further evaluation of severe asthma  8) Return in about 6 weeks (around 6/29/2018) for follow up with FLOR Bhandari, if not sooner, review of symptoms, sleep study results, CAT scan results, pulmonary function results.

## 2018-05-18 NOTE — PATIENT INSTRUCTIONS
1) Start Breo 200-25 1 puff, once a day with mouth rinse  2) Continue rescue inhaler and nebulizer as needed  3) Referral to allergist  4) Sleep study   5) Start using Singulair in the evening  6) Vaccines: Up to date with Prevnar 13 today  7) CT of chest for further evaluation of severe asthma  8) Return in about 6 weeks (around 6/29/2018) for follow up with FLOR Bhandari, if not sooner, review of symptoms, sleep study results, CAT scan results, pulmonary function results.

## 2018-05-18 NOTE — PROCEDURES
Technician: Melissa Alberts CPFT  Tech notes:  Good pt effort and cooperation. ATS standards met.  Albuterol sulfate administered via nebulizer..    Interpretation:    SPIROMETRY:  1. FVC was 2.54 L, 75 % of predicted  2. FEV1 was 1.81 L, 61 % of predicted   3. FEV1/FVC ratio was 71 %  4. There was good response to bronchodilators with FEV1 increased by 29%  5. Flow volume loop was consistent with obstruction        IMPRESSION:  The patient had obstructive lung disease with improvement after bronchodilators. These findings are consistent with the patient listed diagnosis of asthma. The patient also has low FVC which is suggestive of a component of restrictive lung disease. Complete pulmonary function tests as recommended to rule out restriction. Clinical correlation is required.

## 2018-05-21 PROBLEM — G47.10 HYPERSOMNOLENCE: Status: ACTIVE | Noted: 2018-05-21

## 2018-05-21 PROBLEM — J45.41 MODERATE PERSISTENT ASTHMA WITH ACUTE EXACERBATION: Status: ACTIVE | Noted: 2018-05-21

## 2018-05-21 PROBLEM — R06.89 GASPING FOR BREATH: Status: ACTIVE | Noted: 2018-05-21

## 2018-05-29 ENCOUNTER — HOSPITAL ENCOUNTER (OUTPATIENT)
Dept: RADIOLOGY | Facility: MEDICAL CENTER | Age: 28
End: 2018-05-29
Attending: NURSE PRACTITIONER
Payer: COMMERCIAL

## 2018-05-29 DIAGNOSIS — J45.41 MODERATE PERSISTENT ASTHMA WITH ACUTE EXACERBATION: ICD-10-CM

## 2018-05-29 PROCEDURE — 71250 CT THORAX DX C-: CPT

## 2018-06-13 ENCOUNTER — HOSPITAL ENCOUNTER (OUTPATIENT)
Dept: LAB | Facility: MEDICAL CENTER | Age: 28
End: 2018-06-13
Attending: NURSE PRACTITIONER
Payer: COMMERCIAL

## 2018-06-13 DIAGNOSIS — J45.50 SEVERE PERSISTENT ASTHMA, UNSPECIFIED WHETHER COMPLICATED: ICD-10-CM

## 2018-06-13 LAB — EOSINOPHIL # BLD AUTO: 0.73 K/UL (ref 0–0.51)

## 2018-06-13 PROCEDURE — 36415 COLL VENOUS BLD VENIPUNCTURE: CPT

## 2018-06-13 PROCEDURE — 82785 ASSAY OF IGE: CPT

## 2018-06-13 PROCEDURE — 86003 ALLG SPEC IGE CRUDE XTRC EA: CPT | Mod: 91

## 2018-06-13 PROCEDURE — 85004 AUTOMATED DIFF WBC COUNT: CPT

## 2018-06-15 LAB
ALMOND IGE QN: 0.14 KU/L
AVOCADO IGE QN: 0.38 KU/L
BANANA IGE QN: 0.28 KU/L
CELERY IGE QN: 0.25 KU/L
CHESTNUT IGE QN: 0.29 KU/L
COCONUT IGE QN: 0.27 KU/L
COW MILK IGE QN: <0.1 KU/L
DEPRECATED MISC ALLERGEN IGE RAST QL: NORMAL
EGG WHITE IGE QN: <0.1 KU/L
GRAPE IGE QN: 0.22 KU/L
IGE SERPL-ACNC: 226 KU/L
KIWIFRUIT IGE QN: 0.19 KU/L
OAT IGE QN: 0.29 KU/L
PAPAYA IGE QN: 0.26 KU/L
PEANUT IGE QN: 0.33 KU/L
PECAN/HICK NUT IGE QN: <0.1 KU/L
POTATO IGE QN: 0.29 KU/L
SESAME SEED IGE QN: 0.36 KU/L
SOYBEAN IGE QN: 0.23 KU/L
TOMATO IGE QN: 0.36 KU/L
WATERMELON IGE QN: 0.29 KU/L
WHEAT IGE QN: 0.3 KU/L

## 2018-06-19 ENCOUNTER — SLEEP STUDY (OUTPATIENT)
Dept: SLEEP MEDICINE | Facility: MEDICAL CENTER | Age: 28
End: 2018-06-19
Attending: NURSE PRACTITIONER
Payer: COMMERCIAL

## 2018-06-19 DIAGNOSIS — G47.10 HYPERSOMNOLENCE: ICD-10-CM

## 2018-06-19 DIAGNOSIS — R06.89 GASPING FOR BREATH: ICD-10-CM

## 2018-06-19 PROCEDURE — 95810 POLYSOM 6/> YRS 4/> PARAM: CPT | Performed by: INTERNAL MEDICINE

## 2018-06-20 NOTE — PROCEDURES
Clinical Comments:  The patient underwent a comprehensive polysomnogram using the standard montage for measurement of parameters of sleep, respiratory events, movement abnormalities, heart rate and rhythm. A microphone was used to monitor snoring.      INTERPRETATION:  The study was started at 10:06 PM.  The total recording time was 383.2 minutes with a sleep period of 288.4 minutes and the total sleep time was 268.4 minutes with a sleep efficiency of 70.0%.  The sleep latency was 94.9 minutes, and REM latency was 93.5 minutes.  The patient experienced 57 arousals in total, for an arousal index of 12.7    RESPIRATORY: The patient had 0 apneas in total.  Of these, 0 were obstructive apneas, and 0 were central apneas.  This resulted in an apnea index (AI) of 0.0.  The patient had 0 hypopneas, for a hypopnea index of 0.0.  The overall AHI was 0.0, while the AHI during Stage R sleep was 0.0.  AHI while supine was 0.0.    OXIMETRY: Oxygen saturation monitoring showed a mean SpO2 of 94.6%, with a minimum oxygen saturation of 90.0%.  Oxygen saturations were less than or = 89% for 0.0 minutes of sleep time.    CARDIAC: The highest heart rate during the recording was 98.0 beats per minute.  The average heart rate during sleep was 70.1 bpm.    LIMB MOVEMENTS: There were a total of 0 PLMs during sleep, of which 0 were PLMs arousals.  This resulted in a PLMS index of 0.0.    The sleep efficiency was 70%.  Sleep architecture showed normal sleep stages.  Sleep latency was prolonged at 95 minutes.  No EKG or EMG abnormalities were observed.    Once asleep no significant snoring, obstructive or central apneas or restless legs were noted.  Oxygen saturations were normal.    Interpretation:  No evidence of sleep apnea or hypoxia.  Prolonged sleep latency.    Recommendations:  Good sleep hygiene encouraged.  If the patient has chronic insomnia then cognitive behavioral therapy could be of benefit.  There is no indication of sleep  apnea.

## 2018-07-18 ENCOUNTER — APPOINTMENT (OUTPATIENT)
Dept: PULMONOLOGY | Facility: HOSPICE | Age: 28
End: 2018-07-18
Payer: COMMERCIAL

## 2018-08-06 ENCOUNTER — NON-PROVIDER VISIT (OUTPATIENT)
Dept: PULMONOLOGY | Facility: HOSPICE | Age: 28
End: 2018-08-06
Payer: COMMERCIAL

## 2018-08-06 ENCOUNTER — OFFICE VISIT (OUTPATIENT)
Dept: PULMONOLOGY | Facility: HOSPICE | Age: 28
End: 2018-08-06
Payer: COMMERCIAL

## 2018-08-06 VITALS
OXYGEN SATURATION: 96 % | SYSTOLIC BLOOD PRESSURE: 120 MMHG | HEIGHT: 60 IN | DIASTOLIC BLOOD PRESSURE: 70 MMHG | WEIGHT: 105 LBS | HEART RATE: 69 BPM | BODY MASS INDEX: 20.62 KG/M2 | RESPIRATION RATE: 16 BRPM | TEMPERATURE: 98.4 F

## 2018-08-06 DIAGNOSIS — J45.41 MODERATE PERSISTENT ASTHMA WITH ACUTE EXACERBATION: ICD-10-CM

## 2018-08-06 DIAGNOSIS — G47.10 HYPERSOMNOLENCE: ICD-10-CM

## 2018-08-06 DIAGNOSIS — R76.8 LOW SERUM IGE: ICD-10-CM

## 2018-08-06 PROCEDURE — 94060 EVALUATION OF WHEEZING: CPT | Performed by: INTERNAL MEDICINE

## 2018-08-06 PROCEDURE — 99213 OFFICE O/P EST LOW 20 MIN: CPT | Performed by: NURSE PRACTITIONER

## 2018-08-06 ASSESSMENT — PULMONARY FUNCTION TESTS
FEV1_PREDICTED: 65
FEV1_PERCENT_CHANGE: 10
FVC_PREDICTED: 3.31
FEV1_PERCENT_CHANGE: -1
FEV1/FVC_PREDICTED: 87.01
FEV1: 1.88
FEV1: 2.07
FEV1/FVC_PERCENT_CHANGE: -1000
FEV1_PREDICTED: 2.88
FEV1/FVC: 72
FVC_PERCENT_PREDICTED: 79
FEV1/FVC_PERCENT_PREDICTED: 82
FVC: 2.57
FVC: 2.62
FEV1/FVC: 80.54

## 2018-08-06 NOTE — PATIENT INSTRUCTIONS
1) Start Breo 200-25 1 puff, once a day with mouth rinse  2) Continue rescue inhaler and nebulizer as needed  3) Referral to allergist Dr. Hughes  4) Start process with Samina  5) Vaccines: Up to date with Prevnar 13  6) Return in about 2 months (around 10/6/2018) for follow up with FLOR Bhandari, if not sooner, review of symptoms.

## 2018-08-06 NOTE — PROGRESS NOTES
CC:  Here for f/u pulmonary issues as listed below    HPI:   Kenyetta presents today for follow up for asthma with fannie, CAT scan, and sleep study results.    [place] from 8/2018 improved from May 2018 indicate a Fev1 of 1.88L or 65% predicted with a moderate bronchodilator response, Fev1/FVC ratio of 72.    She is a former smoker, 0.25-pack-year history, quit in 2017.  X-ray from March 20, 2018 which are reviewed results noting mild right basilar opacity, worrisome for pneumonia and required an antibiotic that she found helpful.  She did not f/u with CXR that was ordered.  Completed a chest CAT scan showing no acute abnormalities.  Tiny left mediastinal calcification may be within a lymph node.  Allergy workup revealed elevated eosinophil count at 0.73, IgE elevated at 226.  Food allergies of avocado, tomato and sesame.  She has not followed up with referral to allergist.    She was started on Breo 200-25 last office visit but admits she only used one month and did not refill it due to cost.    She has been utilizing her rescue inhaler 4 times a day and nebulizer 1 times a day with [place] smoke.  She reports more wheezing and shortness of breath with chest tightness due to the [place] smoke.  I reiterated the importance of maintenance therapy and the sample pharmacy.    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  There is a slight language barrier.  She is originally from the [facility] and lived there for 23 years.  She moved to [place] 2 years ago first to [place], then to [place], then to [place].  She found her breathing to be best in [place], but the worst in [place].  She has no family history of asthma or pulmonary disease.      Completed sleep study for suspicion of DAMARIS that was negative.  It was completed June 2018 showing an AHI of 0 with minimum oxygenation of 90%.  Her sleep efficiency was at 70%.  Patient is currently sleeping 8 hours per night. They have no trouble falling asleep.   They do not feel refreshed in the  morning and denies morning H/A. They feel tired throughout the day and denies naps.  Patient reports snoring, apnea events and paroxysmal nocturnal dyspnea events. They have never fallen asleep in conversation, at the wheel, or at work.  They deny sleepwalking/talking.         Patient Active Problem List    Diagnosis Date Noted   • Low serum IgE 08/08/2018   • Gasping for breath 05/21/2018   • Hypersomnolence 05/21/2018   • Moderate persistent asthma with acute exacerbation 05/21/2018   • RAD (reactive airway disease) 09/21/2017   • History of dizziness 09/21/2017   • Thoracic spine pain 09/21/2017   • Lumbar spine pain 09/21/2017       Past Medical History:   Diagnosis Date   • Asthma    • Back pain    • Mumps    • Scarlet fever    • Scoliosis     Mild Dextroconvex Scoliosis per patient 1/26/18   • Scoliosis     Mild Dextroconvex Scoliosis per New Patient Packet       History reviewed. No pertinent surgical history.    Family History   Problem Relation Age of Onset   • Other Mother         DDD   • Asthma Father        Social History   Substance Use Topics   • Smoking status: Former Smoker     Packs/day: 0.25     Years: 1.00     Types: Cigarettes     Quit date: 10/1/2017   • Smokeless tobacco: Never Used      Comment: per patient: 1/3 pack per day   • Alcohol use 1.2 oz/week     2 Glasses of wine per week       Current Outpatient Prescriptions   Medication Sig Dispense Refill   • Fluticasone Furoate-Vilanterol (BREO ELLIPTA) 200-25 MCG/INH AEROSOL POWDER, BREATH ACTIVATED Inhale 1 Puff by mouth every day. Rinse mouth after use. 1 Each 5   • albuterol (VENTOLIN HFA) 108 (90 Base) MCG/ACT Aero Soln inhalation aerosol Inhale 2 Puffs by mouth every 6 hours as needed for Shortness of Breath.     • DiphenhydrAMINE HCl (BENADRYL ALLERGY PO) Take  by mouth.     • MethylPREDNISolone (MEDROL DOSEPAK) 4 MG Tablet Therapy Pack        No current facility-administered medications for this visit.           Allergies: Patient has  no known allergies.          ROS   Gen: Denies fever, chills, unintentional weight loss, fatigue, night sweats  E/N/T: Denies nasal congestion, ear pain  Resp:Denies  hemoptysis  CV: Denies chest pain, palpitations  Sleep:Denies morning headache  Neuro: Denies frequent headaches, weakness, dizziness  GI: Denies acid reflux, N/V  See HPI.  All other systems reviewed and negative          Vital signs for this encounter:  Vitals:    08/06/18 1114   Height: 1.524 m (5')   Weight: 47.6 kg (105 lb)   Weight % change since last entry.: 0 %   BP: 120/70   Pulse: 69   BMI (Calculated): 20.51   Resp: 16   Temp: 36.9 °C (98.4 °F)   O2 sat % room air: 96 %                 Physical Exam:   Appearance: well developed, well nourished, no acute distress.   Eyes: PERRL, EOM intact, sclere white, conjunctiva moist.  Ears: no lesions or deformities.  Hearing: grossly intact.  Nose: no lesions or deformities.  Dentition: good dentition.   Oropharynx: tongue normal, posterior pharynx without erythema or exudate.  Neck: supple, trachea midline, no masses.  Respiratory effort: no intercostal retractions or use of accessory muscles.  Lung auscultation: Bilateral diminished   Heart auscultation: no murmur, rub, or gallop   Extremities: no cyanosis or edema.  Abdomen: soft, non-tender, no masses.  Gait and station: without difficulty   Digits and Nails: no clubbing, cyanosis, petechiae, or nodes.  Cranial nerves: grossly normal.  Motor: no focal deficits observed.   Skin: no rashes, lesions, or ulcers noted.  Orientation: oriented to time, place, and person.  Mood and affect: mood and affect appropriate, normal interaction with examiner.      Assessment   1. Low serum IgE     2. Moderate persistent asthma with acute exacerbation     3. Hypersomnolence         Patient was seen for 25 minutes, more than 50% of time spent in face to face review, counseling, and arranging future evaluation and follow up of medical conditions and care relating to  medication management, review of starting patient on Fascerna. Patient has momentarily declined with sarah smoke and will have the following changes per plan.     PLAN:   Patient Instructions   1) Start Breo 200-25 1 puff, once a day with mouth rinse  2) Continue rescue inhaler and nebulizer as needed  3) Referral to allergist Dr. Hughes  4) Start process with Fascerna  5) Vaccines: Up to date with Prevnar 13  6) Return in about 2 months (around 10/6/2018) for follow up with FLOR Bhandari, if not sooner, review of symptoms.    Addendum: Paperwork for Fascerna completed by ADELSO RAY.

## 2018-08-06 NOTE — PROCEDURES
Good patient effort cooperation. The results of this test meet the ATS standards for acceptability and repeatability. Test was performed on the [employer]/D spirometry system. Predicted values were N-Shamar. A bronchodilator of Ventolin HFA - 2 puffs with a spacer was administered to the patient.    The FVC is 2.62 L or 79%, FEV1 is 1.88 L or 65%, FEV1/FVC 72%.  There is a 10% improvement in FEV1 post albuterol 2.07 L or 71%.    Interpretation:  Spirometry shows moderately severe obstructive ventilatory defect.

## 2018-08-08 ENCOUNTER — TELEPHONE (OUTPATIENT)
Dept: PULMONOLOGY | Facility: HOSPICE | Age: 28
End: 2018-08-08

## 2018-08-08 PROBLEM — R76.8: Status: ACTIVE | Noted: 2018-08-08

## 2018-08-08 NOTE — TELEPHONE ENCOUNTER
Pt informed. , verbalizes understanding    [facility]   Will start process of getting patient started on Fascerna.  If denied by insurance will consider either Nucala or Xolair.

## 2018-08-27 NOTE — TELEPHONE ENCOUNTER
Received fax from [place] - [facility] was unable to find patient with ID # - called [place] and provided them with another ID # they will reach out back to [facility] and let me know what they come up with

## 2018-08-28 NOTE — TELEPHONE ENCOUNTER
If patient has no active insurance coverage, the patient will most likely not want to pay for this.  She needs to obtain insurance coverage or go [facility] if she is a student and paying for coverage.   We can refer her someone else for continued care.

## 2018-08-28 NOTE — TELEPHONE ENCOUNTER
Patient was denied for Fesenra- Patient's insurance we hav on file is no longer active.    Please Advise

## 2018-10-09 ENCOUNTER — OFFICE VISIT (OUTPATIENT)
Dept: PULMONOLOGY | Facility: HOSPICE | Age: 28
End: 2018-10-09
Payer: COMMERCIAL

## 2018-10-09 VITALS
HEART RATE: 68 BPM | TEMPERATURE: 97.7 F | SYSTOLIC BLOOD PRESSURE: 94 MMHG | RESPIRATION RATE: 14 BRPM | BODY MASS INDEX: 20.22 KG/M2 | OXYGEN SATURATION: 98 % | DIASTOLIC BLOOD PRESSURE: 68 MMHG | HEIGHT: 60 IN | WEIGHT: 103 LBS

## 2018-10-09 DIAGNOSIS — J45.40 MODERATE PERSISTENT REACTIVE AIRWAY DISEASE WITHOUT COMPLICATION: ICD-10-CM

## 2018-10-09 PROCEDURE — 99214 OFFICE O/P EST MOD 30 MIN: CPT | Performed by: INTERNAL MEDICINE

## 2018-10-09 ASSESSMENT — PAIN SCALES - GENERAL: PAINLEVEL: NO PAIN

## 2018-10-09 NOTE — PROGRESS NOTES
Kenyetta LAW Do is a 28 y.o. female here for asthma on inhaler. Patient was referred by her primary care.    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  Flu vaccine will be administered this year, we do not currently have supply and she will get this [facility], I did place the order.    She has limited funds, the Brio samples from the healthcare pharmacy are ordered.  She has an adequate supply of albuterol.  No interval new problems.  Lungs remarkably clear.    Prior workup for possible sleep apnea was negative.    She has excellent saturations excellent body mass index, stopped smoking 2 years ago this is counseled reassess here in 6 months but sooner for problems    Constitutional ROS: No unexpected change in weight, No unexplained fevers  Eyes: No change in vision or blurring or double vision  Mouth/Throat ROS: No sore throat, No recent change in voice or hoarseness  Pulmonary ROS: See present history for pertinent positives  Cardiovascular ROS: No chest pain to suggest acute coronary syndrome  Gastrointestinal ROS: No abdominal pain to suggest peptic disease  Musculoskeletal/Extremities ROS: no acute artritis or unusual swelling  Hematologic/Lymphatic ROS: No easy bleeding or unusual lymph node swelling  Neurologic ROS: No new or unusual weakness  Psychiatric ROS: No hallucinations  Allergic/Immunologic: No  urticaria or allergic rash      Current Outpatient Prescriptions   Medication Sig Dispense Refill   • Fluticasone Furoate-Vilanterol (BREO ELLIPTA) 200-25 MCG/INH AEROSOL POWDER, BREATH ACTIVATED Inhale 1 Puff by mouth every day. Rinse mouth after use. 1 Each 5   • albuterol (VENTOLIN HFA) 108 (90 Base) MCG/ACT Aero Soln inhalation aerosol Inhale 2 Puffs by mouth every 6 hours as needed for Shortness of Breath.     • DiphenhydrAMINE HCl (BENADRYL ALLERGY PO) Take  by  mouth.     • MethylPREDNISolone (MEDROL DOSEPAK) 4 MG Tablet Therapy Pack        No current facility-administered medications for this visit.        Social History   Substance Use Topics   • Smoking status: Former Smoker     Packs/day: 0.25     Years: 1.00     Types: Cigarettes     Quit date: 10/1/2017   • Smokeless tobacco: Never Used      Comment: per patient: 1/3 pack per day   • Alcohol use 1.2 oz/week     2 Glasses of wine per week        Past Medical History:   Diagnosis Date   • Asthma    • Back pain    • Mumps    • Scarlet fever    • Scoliosis     Mild Dextroconvex Scoliosis per patient 1/26/18   • Scoliosis     Mild Dextroconvex Scoliosis per New Patient Packet       History reviewed. No pertinent surgical history.    Allergies: Patient has no known allergies.    Family History   Problem Relation Age of Onset   • Other Mother         DDD   • Asthma Father        Physical Examination    Vitals:    10/09/18 1026   Height: 1.524 m (5')   Weight: 46.7 kg (103 lb)   Weight % change since last entry.: 0 %   BP: (!) 94/68   Pulse: 68   BMI (Calculated): 20.12   Resp: 14   Temp: 36.5 °C (97.7 °F)   TempSrc: Oral       General Appearance: alert, no distress  Skin: Skin color, texture, turgor normal. No rashes or lesions.  Eyes: negative  Oropharynx: Lips, mucosa, and tongue normal. Teeth and gums normal. Oropharynx moist and without lesion  Lungs: positive findings: Remarkably clear  Heart: negative. RRR without murmur, gallop, or rubs.  No ectopy.  Abdomen: Abdomen soft, non-tender. . No masses,  No organomegaly  Extremities:  No deformities, edema, or skin discoloration  Joints: No acute arthritis  Peripheral Pulses:perfused  Neurologic: intact grossly  No clubbing or cyanosis      Imaging: None today    PFTS: None to      Assessment and Plan  1. Moderate persistent reactive airway disease without complication  Now controlled  - Influenza Vaccine Quad Injection >3Y (Preserved)      Followup Return in about 6  months (around 4/9/2019) for With MD or APRN.

## 2018-10-09 NOTE — PATIENT INSTRUCTIONS
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  Flu vaccine will be administered this year, we do not currently have supply and she will get this at [facility], I did place the order.    She has limited funds, the Brio samples from the healthcare pharmacy are ordered.  She has an adequate supply of albuterol.  No interval new problems.  Lungs remarkably clear.    Prior workup for possible sleep apnea was negative.    She has excellent saturations excellent body mass index, stopped smoking 2 years ago this is counseled reassess here in 6 months but sooner for problems

## 2018-12-20 NOTE — TELEPHONE ENCOUNTER
Have we ever prescribed this med? Yes.  If yes, what date? Sample 10/9/18    Last OV: 10/9/18- Dr. Murray    Next OV: 4/9/19    DX: Moderate persistent reactive airway disease w/o comp    Medications: sample of Breo 200

## 2019-04-09 ENCOUNTER — APPOINTMENT (OUTPATIENT)
Dept: PULMONOLOGY | Facility: HOSPICE | Age: 29
End: 2019-04-09

## 2019-05-14 NOTE — PROCEDURES
Good patient effort & cooperation. The results of this test meet the ATS standards for acceptability and repeatability. Test was performed on the [employer]eeFS/D spirometry system. Predicted values were N-Shamar.   A bronchodilator of Ventolin HFA - 2 puffs with a spacer was administered to the patient.    Spirometry performed in March 28, 2018 showed significant airflow obstruction, mid flows at 25% predicted. Dramatic bronchodilator response and some variability in the appearance of the volume loops suggest may be some effort related reduction of flow. Clinical correlation is required. Volumes and oxygen transfer not measured in this study.   Skin normal color for race, warm, dry and intact. No evidence of rash.